# Patient Record
Sex: FEMALE | Race: WHITE | Employment: UNEMPLOYED | URBAN - METROPOLITAN AREA
[De-identification: names, ages, dates, MRNs, and addresses within clinical notes are randomized per-mention and may not be internally consistent; named-entity substitution may affect disease eponyms.]

---

## 2017-01-13 ENCOUNTER — ED HISTORICAL/CONVERTED ENCOUNTER (OUTPATIENT)
Dept: OTHER | Age: 42
End: 2017-01-13

## 2017-05-28 ENCOUNTER — ED HISTORICAL/CONVERTED ENCOUNTER (OUTPATIENT)
Dept: OTHER | Age: 42
End: 2017-05-28

## 2017-05-31 ENCOUNTER — OP HISTORICAL/CONVERTED ENCOUNTER (OUTPATIENT)
Dept: OTHER | Age: 42
End: 2017-05-31

## 2017-07-22 ENCOUNTER — ED HISTORICAL/CONVERTED ENCOUNTER (OUTPATIENT)
Dept: OTHER | Age: 42
End: 2017-07-22

## 2017-11-01 ENCOUNTER — ED HISTORICAL/CONVERTED ENCOUNTER (OUTPATIENT)
Dept: OTHER | Age: 42
End: 2017-11-01

## 2018-03-27 ENCOUNTER — ED HISTORICAL/CONVERTED ENCOUNTER (OUTPATIENT)
Dept: OTHER | Age: 43
End: 2018-03-27

## 2018-06-17 ENCOUNTER — ANESTHESIA EVENT (OUTPATIENT)
Dept: MEDSURG UNIT | Age: 43
End: 2018-06-17
Payer: COMMERCIAL

## 2018-06-17 RX ORDER — SODIUM CHLORIDE 0.9 % (FLUSH) 0.9 %
5-10 SYRINGE (ML) INJECTION AS NEEDED
Status: CANCELLED | OUTPATIENT
Start: 2018-06-17

## 2018-06-17 RX ORDER — SODIUM CHLORIDE 9 MG/ML
1000 INJECTION, SOLUTION INTRAVENOUS CONTINUOUS
Status: CANCELLED | OUTPATIENT
Start: 2018-06-17

## 2018-06-17 RX ORDER — DIPHENHYDRAMINE HYDROCHLORIDE 50 MG/ML
12.5 INJECTION, SOLUTION INTRAMUSCULAR; INTRAVENOUS AS NEEDED
Status: CANCELLED | OUTPATIENT
Start: 2018-06-17 | End: 2018-06-17

## 2018-06-17 RX ORDER — MIDAZOLAM HYDROCHLORIDE 1 MG/ML
0.5 INJECTION, SOLUTION INTRAMUSCULAR; INTRAVENOUS
Status: CANCELLED | OUTPATIENT
Start: 2018-06-17

## 2018-06-17 RX ORDER — SODIUM CHLORIDE, SODIUM LACTATE, POTASSIUM CHLORIDE, CALCIUM CHLORIDE 600; 310; 30; 20 MG/100ML; MG/100ML; MG/100ML; MG/100ML
25 INJECTION, SOLUTION INTRAVENOUS CONTINUOUS
Status: CANCELLED | OUTPATIENT
Start: 2018-06-17

## 2018-06-17 RX ORDER — MORPHINE SULFATE 10 MG/ML
2 INJECTION, SOLUTION INTRAMUSCULAR; INTRAVENOUS
Status: CANCELLED | OUTPATIENT
Start: 2018-06-17

## 2018-06-17 RX ORDER — ONDANSETRON 2 MG/ML
4 INJECTION INTRAMUSCULAR; INTRAVENOUS AS NEEDED
Status: CANCELLED | OUTPATIENT
Start: 2018-06-17

## 2018-06-17 RX ORDER — FENTANYL CITRATE 50 UG/ML
25 INJECTION, SOLUTION INTRAMUSCULAR; INTRAVENOUS
Status: CANCELLED | OUTPATIENT
Start: 2018-06-17

## 2018-06-17 NOTE — H&P
CARE TEAM:  Patient Care Team:  Sheila Doan MD as PCP - General (Family Medicine)     ASSESSMENT:  1. Other closed intra-articular fracture of distal end of right radius with malunion, subsequent encounter    2. Arthritis of right wrist          There is no problem list on file for this patient.        PLAN:  Treatment Plan: We have discussed treatment options at length. I have offered a cortisone injection but she has declined. We have discussed surgical management. I recommend a total wrist fusion with distal ulnar resection. We have discussed this at length. We have discussed the reasonable expectations. We have discussed usual postoperative course. She has given informed consent to proceed.         Orders Placed This Encounter    X-ray wrist right 3+ views (11216)      Follow-up: Return for first postoperative visit.      HISTORY OF PRESENT ILLNESS:  Chief Complaint: Pain of the Right Wrist     Age: 44 y.o. Sex: female   History of present illness: Mae Sierra  is a pleasant 44 y.o. female who presents today for evaluation of her right wrist. She complains of significant pain in the wrist.  She has a history of a distal radius fracture 3 years ago. Comminuted intra-articular fracture. She had a nonunion. At the time she was a heavy smoker. She reports she has cut back some on her smoking. She underwent attempt at revision surgery with bridge plating and bone grafting. This was partially successful. Her hardware was removed. She has a resulting malunion. She now complains of significant pain. sHe complains of deformity of the wrist. Limited motion.     Past medical history, past surgical history, medications, allergies, social history, and review of systems have been reviewed by me. No current facility-administered medications on file prior to encounter.       Current Outpatient Prescriptions on File Prior to Encounter   Medication Sig Dispense Refill    gabapentin (NEURONTIN) 300 mg capsule Take 300 mg by mouth three (3) times daily.  ALPRAZolam (XANAX) 1 mg tablet Take 1 mg by mouth three (3) times daily.  OTHER paxil PO daily, pt does not know dosage      OTHER Propanolol PO daily, for migraine headache prevention, patient does not know dosage         Past Medical History:   Diagnosis Date    Cervical cancer ([facility] [address] 1999    Ill-defined condition     migraine ha    Psychiatric disorder     depression,panic attacks    Wrist fracture, right 8/2015       Allergies   Allergen Reactions    Seafood Anaphylaxis    Penicillins Hives and Swelling       Social History     Social History    Marital status:      Spouse name: N/A    Number of children: N/A    Years of education: N/A     Occupational History    Not on file. Social History Main Topics    Smoking status: Current Every Day Smoker     Packs/day: 1.00     Years: 23.00    Smokeless tobacco: Not on file    Alcohol use Not on file    Drug use: Not on file    Sexual activity: Not on file     Other Topics Concern    Not on file     Social History Narrative          Review of Systems   6/12/2018     Constitutional: Unexplained: Negative  Genitourinary: Frequent Urination: Negative  HEENT: Vision Loss: Positive  Neurological: Memory Loss: Negative  Integumentary: Rash: Negative  Cardiovascular: Palpatations: Negative  Hematologic: Bruises/Bleeds Easily: Negative  Gastrointestinal: Constipation: Negative  Immunological: Seasonal Allergies: Negative  Musculoskeletal: Joint Pain: Positive        OBJECTIVE:  Constitutional:  No acute distress. Pleasant and cooperative with exam.  HEENT: Normocephalic. Atraumatic. Eyes are clear  Hearing intact to spoken word   Respiratory:  Breathing unlabored. .  Cardiovascular:  No marked edema. Psychiatric: Alert.  Affect appropriate. Gait: Normal.  Musculoskeletal    Skin is intact. She has obvious deformity of the wrist.  Significant dorsal prominence of the distal ulna.   She has very limited wrist motion. She has 20 degrees of extension. 40 degrees of flexion. She has limited forearm rotation due to guarding. Full digital motion. Minimal swelling. Tenderness globally about the carpus.     IMAGING / STUDIES:   Order: XR WRIST 3+ VW RIGHT - Indication: Other closed intra-articular   fracture of distal end of right radius with malunion, subsequent encounter        X-ray Wrist Right 3+ Views (53429)     Result Date: 6/12/2018  PA, Lat, Oblique.      Notes  I ordered and interpreted three views of the wrist.  These demonstrate   severely malunited distal radius fracture. She has significant arthritic   change. Significant shortening of the fracture. Significant angulation.     Loss of radial height.

## 2018-06-18 ENCOUNTER — ANESTHESIA (OUTPATIENT)
Dept: MEDSURG UNIT | Age: 43
End: 2018-06-18
Payer: COMMERCIAL

## 2018-06-18 ENCOUNTER — HOSPITAL ENCOUNTER (OUTPATIENT)
Age: 43
Setting detail: OUTPATIENT SURGERY
Discharge: HOME | End: 2018-06-18
Attending: ORTHOPAEDIC SURGERY | Admitting: ORTHOPAEDIC SURGERY
Payer: COMMERCIAL

## 2018-06-18 VITALS
SYSTOLIC BLOOD PRESSURE: 105 MMHG | OXYGEN SATURATION: 95 % | DIASTOLIC BLOOD PRESSURE: 58 MMHG | TEMPERATURE: 98.2 F | RESPIRATION RATE: 15 BRPM | WEIGHT: 147.49 LBS | HEART RATE: 74 BPM | HEIGHT: 71 IN | BODY MASS INDEX: 20.65 KG/M2

## 2018-06-18 DIAGNOSIS — M19.031 ARTHRITIS OF RIGHT WRIST: Primary | ICD-10-CM

## 2018-06-18 PROCEDURE — 77030003601 HC NDL NRV BLK BBMI -A

## 2018-06-18 PROCEDURE — 77030003737 HC BIT DRL ACMD -C: Performed by: ORTHOPAEDIC SURGERY

## 2018-06-18 PROCEDURE — 74011250636 HC RX REV CODE- 250/636: Performed by: ANESTHESIOLOGY

## 2018-06-18 PROCEDURE — 74011000250 HC RX REV CODE- 250

## 2018-06-18 PROCEDURE — 77030018836 HC SOL IRR NACL ICUM -A: Performed by: ORTHOPAEDIC SURGERY

## 2018-06-18 PROCEDURE — 76210000034 HC AMBSU PH I REC 0.5 TO 1 HR: Performed by: ORTHOPAEDIC SURGERY

## 2018-06-18 PROCEDURE — 77030020754 HC CUF TRNQT 2BLA STRY -B: Performed by: ORTHOPAEDIC SURGERY

## 2018-06-18 PROCEDURE — 77030032490 HC SLV COMPR SCD KNE COVD -B: Performed by: ORTHOPAEDIC SURGERY

## 2018-06-18 PROCEDURE — 74011250636 HC RX REV CODE- 250/636

## 2018-06-18 PROCEDURE — 77030035418: Performed by: ORTHOPAEDIC SURGERY

## 2018-06-18 PROCEDURE — 76030000001 HC AMB SURG OR TIME 1 TO 1.5: Performed by: ORTHOPAEDIC SURGERY

## 2018-06-18 PROCEDURE — 77030020782 HC GWN BAIR PAWS FLX 3M -B

## 2018-06-18 PROCEDURE — 77030002916 HC SUT ETHLN J&J -A: Performed by: ORTHOPAEDIC SURGERY

## 2018-06-18 PROCEDURE — 74011250636 HC RX REV CODE- 250/636: Performed by: ORTHOPAEDIC SURGERY

## 2018-06-18 PROCEDURE — 77030031139 HC SUT VCRL2 J&J -A: Performed by: ORTHOPAEDIC SURGERY

## 2018-06-18 PROCEDURE — 77030006788 HC BLD SAW OSC STRY -B: Performed by: ORTHOPAEDIC SURGERY

## 2018-06-18 PROCEDURE — C1713 ANCHOR/SCREW BN/BN,TIS/BN: HCPCS | Performed by: ORTHOPAEDIC SURGERY

## 2018-06-18 PROCEDURE — 77030028224 HC PDNG CST BSNM -A: Performed by: ORTHOPAEDIC SURGERY

## 2018-06-18 PROCEDURE — 77030010509 HC AIRWY LMA MSK TELE -A: Performed by: ANESTHESIOLOGY

## 2018-06-18 PROCEDURE — 85018 HEMOGLOBIN: CPT

## 2018-06-18 PROCEDURE — 76060000062 HC AMB SURG ANES 1 TO 1.5 HR: Performed by: ORTHOPAEDIC SURGERY

## 2018-06-18 DEVICE — 2.3 MM X 8 MM HEXALOBE MULTISCREW
Type: IMPLANTABLE DEVICE | Site: WRIST | Status: FUNCTIONAL
Brand: ACUMED

## 2018-06-18 DEVICE — 3.5MM X 10MM LOCKING HEXALOBE SCREW
Type: IMPLANTABLE DEVICE | Site: WRIST | Status: FUNCTIONAL
Brand: ACUMED

## 2018-06-18 DEVICE — 2.3 MM X 14 MM HEXALOBE MULTISCREW
Type: IMPLANTABLE DEVICE | Site: WRIST | Status: FUNCTIONAL
Brand: ACUMED

## 2018-06-18 DEVICE — 3.5MM X 12MM NON-LOCKING HEXALOBE SCREW
Type: IMPLANTABLE DEVICE | Site: WRIST | Status: FUNCTIONAL
Brand: ACUMED

## 2018-06-18 DEVICE — 2.3 MM X 10 MM HEXALOBE MULTISCREW
Type: IMPLANTABLE DEVICE | Site: WRIST | Status: FUNCTIONAL
Brand: ACUMED

## 2018-06-18 DEVICE — 2.3 MM X 12 MM HEXALOBE MULTISCREW
Type: IMPLANTABLE DEVICE | Site: WRIST | Status: FUNCTIONAL
Brand: ACUMED

## 2018-06-18 DEVICE — TOTAL WRIST FUSION PLATE, NEUTRAL
Type: IMPLANTABLE DEVICE | Site: WRIST | Status: FUNCTIONAL
Brand: ACUMED

## 2018-06-18 DEVICE — 3.5MM X 12MM LOCKING HEXALOBE SCREW
Type: IMPLANTABLE DEVICE | Site: WRIST | Status: FUNCTIONAL
Brand: ACUMED

## 2018-06-18 DEVICE — 3.5MM X 10MM NON-LOCKING HEXALOBE SCREW
Type: IMPLANTABLE DEVICE | Site: WRIST | Status: FUNCTIONAL
Brand: ACUMED

## 2018-06-18 RX ORDER — SODIUM CHLORIDE 0.9 % (FLUSH) 0.9 %
5-10 SYRINGE (ML) INJECTION AS NEEDED
Status: DISCONTINUED | OUTPATIENT
Start: 2018-06-18 | End: 2018-06-18 | Stop reason: HOSPADM

## 2018-06-18 RX ORDER — DEXTROSE, SODIUM CHLORIDE, SODIUM LACTATE, POTASSIUM CHLORIDE, AND CALCIUM CHLORIDE 5; .6; .31; .03; .02 G/100ML; G/100ML; G/100ML; G/100ML; G/100ML
25 INJECTION, SOLUTION INTRAVENOUS CONTINUOUS
Status: DISCONTINUED | OUTPATIENT
Start: 2018-06-18 | End: 2018-06-18 | Stop reason: HOSPADM

## 2018-06-18 RX ORDER — LIDOCAINE HYDROCHLORIDE 10 MG/ML
0.1 INJECTION, SOLUTION EPIDURAL; INFILTRATION; INTRACAUDAL; PERINEURAL AS NEEDED
Status: DISCONTINUED | OUTPATIENT
Start: 2018-06-18 | End: 2018-06-18 | Stop reason: HOSPADM

## 2018-06-18 RX ORDER — DEXAMETHASONE SODIUM PHOSPHATE 4 MG/ML
INJECTION, SOLUTION INTRA-ARTICULAR; INTRALESIONAL; INTRAMUSCULAR; INTRAVENOUS; SOFT TISSUE AS NEEDED
Status: DISCONTINUED | OUTPATIENT
Start: 2018-06-18 | End: 2018-06-18 | Stop reason: HOSPADM

## 2018-06-18 RX ORDER — VANCOMYCIN HYDROCHLORIDE
1250 ONCE
Status: COMPLETED | OUTPATIENT
Start: 2018-06-18 | End: 2018-06-18

## 2018-06-18 RX ORDER — FENTANYL CITRATE 50 UG/ML
50 INJECTION, SOLUTION INTRAMUSCULAR; INTRAVENOUS AS NEEDED
Status: COMPLETED | OUTPATIENT
Start: 2018-06-18 | End: 2018-06-18

## 2018-06-18 RX ORDER — SODIUM CHLORIDE, SODIUM LACTATE, POTASSIUM CHLORIDE, CALCIUM CHLORIDE 600; 310; 30; 20 MG/100ML; MG/100ML; MG/100ML; MG/100ML
25 INJECTION, SOLUTION INTRAVENOUS CONTINUOUS
Status: DISCONTINUED | OUTPATIENT
Start: 2018-06-18 | End: 2018-06-18 | Stop reason: HOSPADM

## 2018-06-18 RX ORDER — ONDANSETRON 2 MG/ML
INJECTION INTRAMUSCULAR; INTRAVENOUS AS NEEDED
Status: DISCONTINUED | OUTPATIENT
Start: 2018-06-18 | End: 2018-06-18 | Stop reason: HOSPADM

## 2018-06-18 RX ORDER — PROPOFOL 10 MG/ML
INJECTION, EMULSION INTRAVENOUS AS NEEDED
Status: DISCONTINUED | OUTPATIENT
Start: 2018-06-18 | End: 2018-06-18 | Stop reason: HOSPADM

## 2018-06-18 RX ORDER — MIDAZOLAM HYDROCHLORIDE 1 MG/ML
1 INJECTION, SOLUTION INTRAMUSCULAR; INTRAVENOUS AS NEEDED
Status: DISCONTINUED | OUTPATIENT
Start: 2018-06-18 | End: 2018-06-18 | Stop reason: HOSPADM

## 2018-06-18 RX ORDER — FENTANYL CITRATE 50 UG/ML
INJECTION, SOLUTION INTRAMUSCULAR; INTRAVENOUS AS NEEDED
Status: DISCONTINUED | OUTPATIENT
Start: 2018-06-18 | End: 2018-06-18 | Stop reason: HOSPADM

## 2018-06-18 RX ORDER — LIDOCAINE HYDROCHLORIDE 20 MG/ML
INJECTION, SOLUTION EPIDURAL; INFILTRATION; INTRACAUDAL; PERINEURAL AS NEEDED
Status: DISCONTINUED | OUTPATIENT
Start: 2018-06-18 | End: 2018-06-18 | Stop reason: HOSPADM

## 2018-06-18 RX ORDER — OXYCODONE HYDROCHLORIDE 15 MG/1
15 TABLET ORAL
Qty: 20 TAB | Refills: 0 | Status: SHIPPED | OUTPATIENT
Start: 2018-06-18

## 2018-06-18 RX ORDER — CARBAMAZEPINE 400 MG/1
800 TABLET, EXTENDED RELEASE ORAL ONCE
COMMUNITY

## 2018-06-18 RX ORDER — SODIUM CHLORIDE 9 MG/ML
25 INJECTION, SOLUTION INTRAVENOUS CONTINUOUS
Status: DISCONTINUED | OUTPATIENT
Start: 2018-06-18 | End: 2018-06-18 | Stop reason: HOSPADM

## 2018-06-18 RX ORDER — OXYCODONE HYDROCHLORIDE 15 MG/1
15 TABLET ORAL
Status: ON HOLD | COMMUNITY
End: 2018-06-18

## 2018-06-18 RX ORDER — SODIUM CHLORIDE, SODIUM LACTATE, POTASSIUM CHLORIDE, CALCIUM CHLORIDE 600; 310; 30; 20 MG/100ML; MG/100ML; MG/100ML; MG/100ML
INJECTION, SOLUTION INTRAVENOUS
Status: DISCONTINUED | OUTPATIENT
Start: 2018-06-18 | End: 2018-06-18 | Stop reason: HOSPADM

## 2018-06-18 RX ORDER — SODIUM CHLORIDE 0.9 % (FLUSH) 0.9 %
5-10 SYRINGE (ML) INJECTION EVERY 8 HOURS
Status: DISCONTINUED | OUTPATIENT
Start: 2018-06-18 | End: 2018-06-18 | Stop reason: HOSPADM

## 2018-06-18 RX ORDER — LEVETIRACETAM 750 MG/1
750 TABLET ORAL 2 TIMES DAILY
COMMUNITY

## 2018-06-18 RX ADMIN — MIDAZOLAM HYDROCHLORIDE 2 MG: 1 INJECTION, SOLUTION INTRAMUSCULAR; INTRAVENOUS at 13:11

## 2018-06-18 RX ADMIN — ONDANSETRON 4 MG: 2 INJECTION INTRAMUSCULAR; INTRAVENOUS at 15:13

## 2018-06-18 RX ADMIN — MIDAZOLAM HYDROCHLORIDE 1 MG: 1 INJECTION, SOLUTION INTRAMUSCULAR; INTRAVENOUS at 13:14

## 2018-06-18 RX ADMIN — DEXAMETHASONE SODIUM PHOSPHATE 4 MG: 4 INJECTION, SOLUTION INTRA-ARTICULAR; INTRALESIONAL; INTRAMUSCULAR; INTRAVENOUS; SOFT TISSUE at 14:22

## 2018-06-18 RX ADMIN — FENTANYL CITRATE 50 MCG: 50 INJECTION, SOLUTION INTRAMUSCULAR; INTRAVENOUS at 13:15

## 2018-06-18 RX ADMIN — FENTANYL CITRATE 25 MCG: 50 INJECTION, SOLUTION INTRAMUSCULAR; INTRAVENOUS at 15:25

## 2018-06-18 RX ADMIN — MIDAZOLAM HYDROCHLORIDE 2 MG: 1 INJECTION, SOLUTION INTRAMUSCULAR; INTRAVENOUS at 13:15

## 2018-06-18 RX ADMIN — VANCOMYCIN HYDROCHLORIDE 1250 MG: 10 INJECTION, POWDER, LYOPHILIZED, FOR SOLUTION INTRAVENOUS at 13:27

## 2018-06-18 RX ADMIN — PROPOFOL 200 MG: 10 INJECTION, EMULSION INTRAVENOUS at 14:13

## 2018-06-18 RX ADMIN — SODIUM CHLORIDE, SODIUM LACTATE, POTASSIUM CHLORIDE, AND CALCIUM CHLORIDE 25 ML/HR: 600; 310; 30; 20 INJECTION, SOLUTION INTRAVENOUS at 13:39

## 2018-06-18 RX ADMIN — SODIUM CHLORIDE, SODIUM LACTATE, POTASSIUM CHLORIDE, CALCIUM CHLORIDE: 600; 310; 30; 20 INJECTION, SOLUTION INTRAVENOUS at 14:09

## 2018-06-18 RX ADMIN — FENTANYL CITRATE 50 MCG: 50 INJECTION, SOLUTION INTRAMUSCULAR; INTRAVENOUS at 13:12

## 2018-06-18 RX ADMIN — FENTANYL CITRATE 25 MCG: 50 INJECTION, SOLUTION INTRAMUSCULAR; INTRAVENOUS at 14:13

## 2018-06-18 RX ADMIN — FENTANYL CITRATE 25 MCG: 50 INJECTION, SOLUTION INTRAMUSCULAR; INTRAVENOUS at 15:19

## 2018-06-18 RX ADMIN — LIDOCAINE HYDROCHLORIDE 60 MG: 20 INJECTION, SOLUTION EPIDURAL; INFILTRATION; INTRACAUDAL; PERINEURAL at 14:13

## 2018-06-18 NOTE — IP AVS SNAPSHOT
[address] [address] 
[phone] Patient: Fortino Lang MRN: [id number] :1975 About your hospitalization You were admitted on:  2018 You last received care in the:  [facility] PACU You were discharged on:  2018 Why you were hospitalized Your primary diagnosis was:  Not on File Follow-up Information Follow up With Details Comments Contact Info Armen Mckeon MD In 2 weeks  [address] [facility] [address] [place] [address] 
[phone] Not On File Bshsi   Not On File (62) Patient has a PCP but that physician is not listed in [address] [facility]. Discharge Orders None A check devika indicates which time of day the medication should be taken. My Medications CONTINUE taking these medications Instructions Each Dose to Equal  
 Morning Noon Evening Bedtime ALPRAZolam 1 mg tablet Commonly known as:  Lamount Marten Your last dose was: Your next dose is: Take 1 mg by mouth three (3) times daily. 1 mg  
    
   
   
   
  
 gabapentin 300 mg capsule Commonly known as:  NEURONTIN Your last dose was: Your next dose is: Take 300 mg by mouth three (3) times daily. 300 mg  
    
   
   
   
  
 KEPPRA 750 mg tablet Generic drug:  levETIRAcetam  
   
Your last dose was: Your next dose is: Take 750 mg by mouth two (2) times a day. 750 mg  
    
   
   
   
  
 * OTHER Your last dose was: Your next dose is:    
   
   
 paxil PO daily, pt does not know dosage * OTHER Your last dose was: Your next dose is:    
   
   
 Propanolol PO daily, for migraine headache prevention, patient does not know dosage  
     
   
   
   
  
 oxyCODONE IR 15 mg immediate release tablet Commonly known as:  OXY-IR Your last dose was:     
   
Your next dose is:    
   
   
 Take 1 Tab by mouth every four (4) hours as needed for Pain. Max Daily Amount: 90 mg.  
 15 mg  
    
   
   
   
  
 TEGretol  mg SR tablet Generic drug:  carBAMazepine XR Your last dose was: Your next dose is: Take 800 mg by mouth once. 800 mg * Notice: This list has 2 medication(s) that are the same as other medications prescribed for you. Read the directions carefully, and ask your doctor or other care provider to review them with you. Where to Get Your Medications Information on where to get these meds will be given to you by the nurse or doctor. ! Ask your nurse or doctor about these medications  
  oxyCODONE IR 15 mg immediate release tablet Opioid Education Prescription Opioids: What You Need to Know: 
 
Prescription opioids can be used to help relieve moderate-to-severe pain and are often prescribed following a surgery or injury, or for certain health conditions. These medications can be an important part of treatment but also come with serious risks. Opioids are strong pain medicines. Examples include hydrocodone, oxycodone, fentanyl, and morphine. Heroin is an example of an illegal opioid. It is important to work with your health care provider to make sure you are getting the safest, most effective care. WHAT ARE THE RISKS AND SIDE EFFECTS OF OPIOID USE? Prescription opioids carry serious risks of addiction and overdose, especially with prolonged use. An opioid overdose, often marked by slow breathing, can cause sudden death. The use of prescription opioids can have a number of side effects as well, even when taken as directed. · Tolerance-meaning you might need to take more of a medication for the same pain relief · Physical dependence-meaning you have symptoms of withdrawal when the medication is stopped.   Withdrawal symptoms can include nausea, sweating, chills, diarrhea, stomach cramps, and muscle aches. Withdrawal can last up to several weeks, depending on which drug you took and how long you took it. · Increased sensitivity to pain · Constipation · Nausea, vomiting, and dry mouth · Sleepiness and dizziness · Confusion · Depression · Low levels of testosterone that can result in lower sex drive, energy, and strength · Itching and sweating RISKS ARE GREATER WITH:      
· History of drug misuse, substance use disorder, or overdose · Mental health conditions (such as depression or anxiety) · Sleep apnea · Older age (72 years or older) · Pregnancy Avoid alcohol while taking prescription opioids. Also, unless specifically advised by your health care provider, medications to avoid include: · Benzodiazepines (such as Xanax or Valium) · Muscle relaxants (such as Soma or Flexeril) · Hypnotics (such as Ambien or Lunesta) · Other prescription opioids KNOW YOUR OPTIONS Talk to your health care provider about ways to manage your pain that don't involve prescription opioids. Some of these options may actually work better and have fewer risks and side effects. Options may include: 
· Pain relievers such as acetaminophen, ibuprofen, and naproxen · Some medications that are also used for depression or seizures · Physical therapy and exercise · Counseling to help patients learn how to cope better with triggers of pain and stress. · Application of heat or cold compress · Massage therapy · Relaxation techniques Be Informed Make sure you know the name of your medication, how much and how often to take it, and its potential risks & side effects. IF YOU ARE PRESCRIBED OPIOIDS FOR PAIN: 
· Never take opioids in greater amounts or more often than prescribed. Remember the goal is not to be pain-free but to manage your pain at a tolerable level. · Follow up with your primary care provider to: · Work together to create a plan on how to manage your pain.  
· Talk about ways to help manage your pain that don't involve prescription opioids. · Talk about any and all concerns and side effects. · Help prevent misuse and abuse. · Never sell or share prescription opioids · Help prevent misuse and abuse. · Store prescription opioids in a secure place and out of reach of others (this may include visitors, children, friends, and family). · Safely dispose of unused/unwanted prescription opioids: Find your community drug take-back program or your pharmacy mail-back program, or flush them down the toilet, following guidance from the Food and Drug Administration (www.fda.gov/Drugs/ResourcesForYou). · Visit www.cdc.gov/drugoverdose to learn about the risks of opioid abuse and overdose. · If you believe you may be struggling with addiction, tell your health care provider and ask for guidance or call [facility] at 9-883-534-UWCW. Discharge Instructions Dr. Molly Heredia Upper Extremity Postoperative Instructions 1. Please maintain the dressing and /or splint placed at surgery until your follow up appointment where it will be removed. Please keep the dressing clean and dry. If you have any questions or problems, please call our office at [phone]. 2. Please elevate the operative extremity to the level of the heart to keep swelling at a minimum. You may get up to move around, but when seated please keep the extremity elevated as much as possible. This will decrease swelling and pain. 3. You were told to be non-weight bearing following surgery. Please do not lift anything heavier than 1 lb with your operative hand. 4. You may ice your Arm/Hand 5 times a day for 20 minutes at a time. 5. You had a block from the Anesthesiologist before surgery. Expect this to wear off around 12-24 hours after you received it.   You should start taking your pain medication before the feeling begins to come back into your arm/ hand. 6. A prescription for pain medication is provided. The key to pain control is staying ahead of the pain. For the first 48-72 hours after your surgery, you may want to take your pain medication on a regular schedule. After that, you may only need it on an as needed basis. 7. If you experience any redness, increased pain, increased swelling not relieved by elevation, drainage, fever, or chills, please call the office at [phone], and speak with Zulma Bunn or Clearance Shade. Please Follow-up at my office [address] [facility] in 2 weeks unless otherwise directed. DISCHARGE SUMMARY from Nurse PATIENT INSTRUCTIONS: 
 
After general anesthesia or intravenous sedation, for 24 hours or while taking prescription Narcotics: · Limit your activities · Do not drive and operate hazardous machinery · Do not make important personal or business decisions · Do  not drink alcoholic beverages · If you have not urinated within 8 hours after discharge, please contact your surgeon on call. Report the following to your surgeon: 
· Excessive pain, swelling, redness or odor of or around the surgical area · Temperature over 100.5 · Nausea and vomiting lasting longer than 4 hours or if unable to take medications · Any signs of decreased circulation or nerve impairment to extremity: change in color, persistent  numbness, tingling, coldness or increase pain · Any questions What to do at Home: 
Recommended activity: See above instructions. If you experience any of the above symptoms, please follow up with Dr. Kvng Green *  Please give a list of your current medications to your Primary Care Provider. *  Please update this list whenever your medications are discontinued, doses are 
    changed, or new medications (including over-the-counter products) are added. *  Please carry medication information at all times in case of emergency situations.  
 
These are general instructions for a healthy lifestyle: No smoking/ No tobacco products/ Avoid exposure to second hand smoke Surgeon General's Warning:  Quitting smoking now greatly reduces serious risk to your health. Obesity, smoking, and sedentary lifestyle greatly increases your risk for illness A healthy diet, regular physical exercise & weight monitoring are important for maintaining a healthy lifestyle You may be retaining fluid if you have a history of heart failure or if you experience any of the following symptoms:  Weight gain of 3 pounds or more overnight or 5 pounds in a week, increased swelling in our hands or feet or shortness of breath while lying flat in bed. Please call your doctor as soon as you notice any of these symptoms; do not wait until your next office visit. Recognize signs and symptoms of STROKE: 
 
F-face looks uneven A-arms unable to move or move unevenly S-speech slurred or non-existent T-time-call 911 as soon as signs and symptoms begin-DO NOT go Back to bed or wait to see if you get better-TIME IS BRAIN. Warning Signs of HEART ATTACK Call 911 if you have these symptoms:  Chest discomfort. Most heart attacks involve discomfort in the center of the chest that lasts more than a few minutes, or that goes away and comes back. It can feel like uncomfortable pressure, squeezing, fullness, or pain.  Discomfort in other areas of the upper body. Symptoms can include pain or discomfort in one or both arms, the back, neck, jaw, or stomach.  Shortness of breath with or without chest discomfort.  Other signs may include breaking out in a cold sweat, nausea, or lightheadedness. Don't wait more than five minutes to call [address]! Fast action can save your life. Calling 911 is almost always the fastest way to get lifesaving treatment.  Emergency Medical Services staff can begin treatment when they arrive  up to an hour sooner than if someone gets to the [facility] by car. The discharge information has been reviewed with the patient and caregiver. The patient and caregiver verbalized understanding. Discharge medications reviewed with the patient and caregiver and appropriate educational materials and side effects teaching were provided. ___________________________________________________________________________________________________________________________________ Introducing [facility] & HEALTH SERVICES! [facility] introduces [employer]era patient portal. Now you can access parts of your medical record, email your doctor's office, and request medication refills online. 1. In your internet browser, go to [email or web address] 2. Click on the First Time User? Click Here link in the Sign In box. You will see the New Member Sign Up page. 3. Enter your Isotera Access Code exactly as it appears below. You will not need to use this code after youve completed the sign-up process. If you do not sign up before the expiration date, you must request a new code. · Isotera Access Code: [email or web address] Expires: 9/16/2018  3:44 PM 
 
4. Enter the last four digits of your Social Security Number (xxxx) and Date of Birth (mm/dd/yyyy) as indicated and click Submit. You will be taken to the next sign-up page. 5. Create a [employer] ID. This will be your Isotera login ID and cannot be changed, so think of one that is secure and easy to remember. 6. Create a [employer] password. You can change your password at any time. 7. Enter your Password Reset Question and Answer. This can be used at a later time if you forget your password. 8. Enter your e-mail address. You will receive e-mail notification when new information is available in [address] 9. Click Sign Up. You can now view and download portions of your medical record. 10. Click the Download Summary menu link to download a portable copy of your medical information.  
 
If you have questions, please visit the Frequently Asked Questions section of the [employer] website. Remember, [employer] is NOT to be used for urgent needs. For medical emergencies, dial 911. Now available from your iPhone and Android! Introducing [facility] As a [employer] patient, I wanted to make you aware of our electronic visit tool called [employer] [facility]. [employer] 24/7 allows you to connect within minutes with a medical provider 24 hours a day, seven days a week via a mobile device or tablet or logging into a secure website from your computer. You can access [facility] from anywhere in the [place]. A virtual visit might be right for you when you have a simple condition and feel like you just dont want to get out of bed, or cant get away from work for an appointment, when your regular [employer] provider is not available (evenings, weekends or holidays), or when youre out of town and need minor care. Electronic visits cost only $49 and if the [employer] 24/7 provider determines a prescription is needed to treat your condition, one can be electronically transmitted to a nearby pharmacy*. Please take a moment to enroll today if you have not already done so. The enrollment process is free and takes just a few minutes. To enroll, please download the [employer] 24/7 heraclio to your tablet or phone, or visit [email or web address] to enroll on your computer. And, as an [address] [place] [address] patient with a [employer] account, the results of your visits will be scanned into your electronic medical record and your primary care provider will be able to view the scanned results. We urge you to continue to see your regular [employer] provider for your ongoing medical care. And while your primary care provider may not be the one available when you seek a [facility] virtual visit, the peace of mind you get from getting a real diagnosis real time can be priceless.    
 
For more information on [facility], view our Frequently Asked Questions (FAQs) at [email or web address]. Sincerely, 
 
Kamilla Hutchison MD 
Chief Medical Officer [address] *:  certain medications cannot be prescribed via [facility] Providers Seen During Your Hospitalization Provider Specialty Primary office phone Griselda Huntsman, MD Orthopedic Surgery [phone] Your Primary Care Physician (PCP) Primary Care Physician Office Phone Office Fax NOT ON FILE ** None ** ** None ** You are allergic to the following Allergen Reactions Seafood Anaphylaxis Penicillins Hives Swelling Recent Documentation Height Weight BMI OB Status Smoking Status 1.791 m 66.9 kg 20.86 kg/m2 Hysterectomy Current Every Day Smoker Emergency Contacts Name Discharge Info Relation Home Work Mobile [facility] CAREGIVER [3] Child [2]   [phone] \[facility] [place] [facility]\"" SURGICAL [facility] DISCHARGE CAREGIVER [3] Spouse [3]   [phone] [address] CAREGIVER [3] Child [2] [phone] Patient Belongings The following personal items are in your possession at time of discharge: 
  Dental Appliances: Partials  Visual Aid: None             Clothing:  (purse in locker) Please provide this summary of care documentation to your next provider. Signatures-by signing, you are acknowledging that this After Visit Summary has been reviewed with you and you have received a copy. Patient Signature:  ____________________________________________________________ Date:  ____________________________________________________________  
  
Bunny Palmer Provider Signature:  ____________________________________________________________ Date:  ____________________________________________________________

## 2018-06-18 NOTE — ANESTHESIA POSTPROCEDURE EVALUATION
Post-Anesthesia Evaluation and Assessment    Patient: Neha Ayala MRN: [id number]  SSN: [id number]    YOB: 1975  Age: 44 y.o. Sex: female       Cardiovascular Function/Vital Signs  Visit Vitals    /58    Pulse 74    Temp 36.8 °C (98.2 °F)    Resp 15    Ht 5' 10.5\" (1.791 m)    Wt 66.9 kg (147 lb 7.8 oz)    SpO2 95%    BMI 20.86 kg/m2       Patient is status post general anesthesia for Procedure(s):  RIGHT WRIST FUSION, RIGHT DISTAL ULNAR RESECTION. Nausea/Vomiting: None    Postoperative hydration reviewed and adequate. Pain:  Pain Scale 1: Numeric (0 - 10) (06/18/18 1527)  Pain Intensity 1: 0 (06/18/18 1527)   Managed    Neurological Status:   Neuro (WDL): Within Defined Limits (06/18/18 1527)  Neuro  LUE Motor Response: Purposeful (06/18/18 1527)  LLE Motor Response: Purposeful (06/18/18 1527)  RUE Motor Response: Pharmocologically paralyzed (06/18/18 1527)  RLE Motor Response: Purposeful (06/18/18 1527)   At baseline    Mental Status and Level of Consciousness: Arousable    Pulmonary Status:   O2 Device: Room air (06/18/18 1531)   Adequate oxygenation and airway patent    Complications related to anesthesia: None    Post-anesthesia assessment completed.  No concerns    Signed By: Stephon Roberson MD     June 18, 2018

## 2018-06-18 NOTE — BRIEF OP NOTE
BRIEF OPERATIVE NOTE    Date of Procedure: 6/18/2018   Preoperative Diagnosis: RIGHT WRIST ARTHRITIS  Postoperative Diagnosis: RIGHT WRIST ARTHRITIS    Procedure(s):  RIGHT WRIST FUSION, RIGHT DISTAL ULNAR RESECTION  Surgeon(s) and Role:     * Sunny Balderrama MD - Primary         Surgical Assistant: non    Surgical Staff:  Circ-1: Oksana Collins RN  Registered Nurse First Assistant: Louie Zavala RN  Scrub RN-1: Srinivas Salazar RN  Event Time In   Incision Start 1425   Incision Close 1517     Anesthesia: Regional   Estimated Blood Loss: min  Specimens: * No specimens in log *   Findings: severe wrist OA   Complications: none  Implants:   Implant Name Type Inv.  Item Serial No.  Lot No. LRB No. Used Action   SCR BNE WRIST FUSION 2.3X14MM -- MULTISCREW HEXALOBE - SNA  SCR BNE WRIST FUSION 2.3X14MM -- MULTISCREW HEXALOBE NA [employer] NA Right 2 Implanted   PLATE WRIST FUSION NEUT --  - SNA  PLATE WRIST FUSION NEUT --  NA [employer] NA Right 1 Implanted   SCR BNE WRIST FUSION 2.3X10MM -- MULTISCREW HEXALOBE - SNA  SCR BNE WRIST FUSION 2.3X10MM -- MULTISCREW HEXALOBE NA [employer] NA Right 1 Implanted   SCR BNE WRIST FUSION 2.3X12MM -- MULTISCREW HEXALOBE - SNA  SCR BNE WRIST FUSION 2.3X12MM -- MULTISCREW HEXALOBE NA [employer] NA Right 1 Implanted   SCR BNE WRIST FUSION 2.3X8MM -- MULTISCREW HEXALOBE - SNA  SCR BNE WRIST FUSION 2.3X8MM -- MULTISCREW HEXALOBE NA [employer] NA Right 1 Implanted   SCR BNE NLCK HEXALOBE 3.5X12MM -- F/ELBOW PLT SYS - SNA  SCR BNE NLCK HEXALOBE 3.5X12MM -- F/ELBOW PLT SYS NA [employer] NA Right 1 Implanted   SCR BNE LCK HEXALOBE 3.5X10MM -- F/ELBOW PLT SYS - SNA  SCR BNE LCK HEXALOBE 3.5X10MM -- F/ELBOW PLT SYS NA [employer] NA Right 1 Implanted   SCR BNE LCK HEXALOBE 3.5X12MM -- F/ELBOW PLT SYS - SNA  SCR BNE LCK HEXALOBE 3.5X12MM -- F/ELBOW PLT SYS NA [employer] NA Right 1 Implanted   SCR BNE NLCK HEXALOBE 3.5X10MM -- F/ELBOW PLT SYS - SNA   SCR BNE NLCK HEXALOBE 3.5X10MM -- F/ELBOW PLT SYS NA [employer] NA Right 1 Implanted

## 2018-06-18 NOTE — DISCHARGE INSTRUCTIONS
Dr. Sharona Hankins Upper Extremity Postoperative Instructions      1. Please maintain the dressing and /or splint placed at surgery until your follow up appointment where it will be removed. Please keep the dressing clean and dry. If you have any questions or problems, please call our office at [phone]. 2. Please elevate the operative extremity to the level of the heart to keep swelling at a minimum. You may get up to move around, but when seated please keep the extremity elevated as much as possible. This will decrease swelling and pain. 3. You were told to be non-weight bearing following surgery. Please do not lift anything heavier than 1 lb with your operative hand. 4. You may ice your Arm/Hand 5 times a day for 20 minutes at a time. 5. You had a block from the Anesthesiologist before surgery. Expect this to wear off around 12-24 hours after you received it. You should start taking your pain medication before the feeling begins to come back into your arm/ hand. 6. A prescription for pain medication is provided. The key to pain control is staying ahead of the pain. For the first 48-72 hours after your surgery, you may want to take your pain medication on a regular schedule. After that, you may only need it on an as needed basis. 7. If you experience any redness, increased pain, increased swelling not relieved by elevation, drainage, fever, or chills, please call the office at [phone], and speak with Alfonso Jones or Mani Hickey. Please Follow-up at my office [address] [facility], [address] in 2 weeks unless otherwise directed.       DISCHARGE SUMMARY from Nurse    PATIENT INSTRUCTIONS:    After general anesthesia or intravenous sedation, for 24 hours or while taking prescription Narcotics:  · Limit your activities  · Do not drive and operate hazardous machinery  · Do not make important personal or business decisions  · Do  not drink alcoholic beverages  · If you have not urinated within 8 hours after discharge, please contact your surgeon on call. Report the following to your surgeon:  · Excessive pain, swelling, redness or odor of or around the surgical area  · Temperature over 100.5  · Nausea and vomiting lasting longer than 4 hours or if unable to take medications  · Any signs of decreased circulation or nerve impairment to extremity: change in color, persistent  numbness, tingling, coldness or increase pain  · Any questions    What to do at Home:  Recommended activity: See above instructions. If you experience any of the above symptoms, please follow up with Dr. Juan Deng     *  Please give a list of your current medications to your Primary Care Provider. *  Please update this list whenever your medications are discontinued, doses are      changed, or new medications (including over-the-counter products) are added. *  Please carry medication information at all times in case of emergency situations. These are general instructions for a healthy lifestyle:    No smoking/ No tobacco products/ Avoid exposure to second hand smoke  Surgeon General's Warning:  Quitting smoking now greatly reduces serious risk to your health. Obesity, smoking, and sedentary lifestyle greatly increases your risk for illness    A healthy diet, regular physical exercise & weight monitoring are important for maintaining a healthy lifestyle    You may be retaining fluid if you have a history of heart failure or if you experience any of the following symptoms:  Weight gain of 3 pounds or more overnight or 5 pounds in a week, increased swelling in our hands or feet or shortness of breath while lying flat in bed. Please call your doctor as soon as you notice any of these symptoms; do not wait until your next office visit.     Recognize signs and symptoms of STROKE:    F-face looks uneven    A-arms unable to move or move unevenly    S-speech slurred or non-existent    T-time-call 911 as soon as signs and symptoms begin-DO NOT go       Back to bed or wait to see if you get better-TIME IS BRAIN. Warning Signs of HEART ATTACK     Call 911 if you have these symptoms:   Chest discomfort. Most heart attacks involve discomfort in the center of the chest that lasts more than a few minutes, or that goes away and comes back. It can feel like uncomfortable pressure, squeezing, fullness, or pain.  Discomfort in other areas of the upper body. Symptoms can include pain or discomfort in one or both arms, the back, neck, jaw, or stomach.  Shortness of breath with or without chest discomfort.  Other signs may include breaking out in a cold sweat, nausea, or lightheadedness. Don't wait more than five minutes to call 911 - MINUTES MATTER! Fast action can save your life. Calling 911 is almost always the fastest way to get lifesaving treatment. Emergency Medical Services staff can begin treatment when they arrive -- up to an hour sooner than if someone gets to the hospital by car. The discharge information has been reviewed with the patient and caregiver. The patient and caregiver verbalized understanding. Discharge medications reviewed with the patient and caregiver and appropriate educational materials and side effects teaching were provided.   ___________________________________________________________________________________________________________________________________

## 2018-06-18 NOTE — PERIOP NOTES
Discharge instructions given and discussed. Questions answered. Sling given and applied onto pt arm. Pt discharged home with adult daughter.

## 2018-06-18 NOTE — ANESTHESIA PROCEDURE NOTES
Peripheral Block    Start time: 6/18/2018 1:12 PM  End time: 6/18/2018 1:22 PM  Performed by: Niurka Shore  Authorized by: Niurka Shore       Pre-procedure:    Indications: at surgeon's request and post-op pain management    Preanesthetic Checklist: risks and benefits discussed, site marked and timeout performed      Block Type:   Block Type:  Supraclavicular  Laterality:  Right  Monitoring:  Standard ASA monitoring, continuous pulse ox, frequent vital sign checks, heart rate, responsive to questions and oxygen  Injection Technique:  Single shot  Procedures: ultrasound guided and nerve stimulator    Patient Position: supine  Prep: betadine and povidone-iodine 7.5% surgical scrub    Location:  Supraclavicular  Needle Type:  Stimuplex  Needle Gauge:  22 G  Needle Localization:  Nerve stimulator and ultrasound guidance  Motor Response: minimal motor response >0.4 mA    Medication Injected:  0.5%  ropivacaine  Volume (mL):  30    Assessment:  Number of attempts:  1  Injection Assessment:  Incremental injection every 5 mL, local visualized surrounding nerve on ultrasound, negative aspiration for blood, no intravascular symptoms, negative aspiration for CSF, no paresthesia and ultrasound image on chart  Patient tolerance:  Patient tolerated the procedure well with no immediate complications

## 2018-06-18 NOTE — OP NOTES
[address]  OPERATIVE REPORT    Neelam Lomas  MR#: [id number]  : 1975  ACCOUNT #: [de-identified]   DATE OF SERVICE: 2018    PREOPERATIVE DIAGNOSES:    1. Right wrist posttraumatic osteoarthritis. 2.  Right distal radioulnar joint posttraumatic arthritis. POSTOPERATIVE DIAGNOSES:   1. Right wrist posttraumatic osteoarthritis. 2.  Right distal radioulnar joint posttraumatic arthritis. PROCEDURE PERFORMED:  1. Right wrist total fusion with local bone graft. 2.  Right distal ulnar resection. SURGEON:  Nivia Sadler MD    ASSISTANT:  Claudeen Coach    ANESTHESIA:  Regional plus general.    ESTIMATED BLOOD LOSS:  Minimal.    SPECIMENS:  None. COMPLICATIONS:  None. IMPLANTS:  An Acumed dorsal wrist fusion plate. INDICATIONS FOR THE PROCEDURE:  A 69-year-old female who is status post a comminuted displaced intra-articular distal radius fracture several years ago. She is a heavy smoker. She underwent ORIF, but had a resulting nonunion. She underwent a nonunion repair with bone graft. This resulted in a malunion of her fracture. She now has developed severe arthritis of the wrist with incongruity of the DRUJ and arthritis of the DRUJ. She has severe pain. She has elected for total wrist fusion and distal ulnar resection. I have discussed risks, benefits, potential complications, and alternatives to surgery with her and she has given informed consent to proceed. DESCRIPTION OF THE PROCEDURE:  The patient was identified in the preoperative holding area. Informed consent was obtained, the operative site was marked. Regional anesthesia was established by the anesthesia team.  She was transported to the OR and placed supine on the operating table. All bony prominences were well padded. A tourniquet was applied to the upper brachium.   After induction of general anesthesia, the right upper extremity was sterilely prepped and draped in the usual fashion. Surgical timeout was held. The operative site was confirmed. Preoperative antibiotics were given. After Esmarch exsanguination, the tourniquet was elevated to 250 mmHg. Her prior dorsal incision was opened. Large skin flaps were raised. Dorsal cutaneous nerves were identified and protected. The extensor retinaculum was incised between the second and fourth dorsal compartments. The second and fourth dorsal compartments were elevated off of the wrist capsule. The wrist capsule was incised. The proximal row bones were then excised with a rongeur and a knife. These bones were then decorticated and morcellized into cancellous bone graft. This was saved for later. The proximal aspect of the capitate was debrided of articular cartilage down to cancellous bone. A saw was used to shorten the radius slightly. She had significant volar angulation which was corrected. Cartilage and subchondral bone was debrided down to cancellous bone. The distal ulna was then exposed. A saw was used to transect the ulna at the ulnar neck. The distal head of the ulna was excised directly off of its soft tissue attachments. The wrist was then reduced. Dorsal plate was applied to the second metacarpal and the dorsal distal radius. The plate was affixed to the metacarpal first.  The carpus was then reduced onto the distal radius and a single nonlocking screw was placed into the distal radius proximally. Fluoroscopy confirmed good reduction of the fusion site and good alignment of the hardware. Additional 3.5 mm locking and nonlocking screws were placed proximally. The bone graft was then packed into the fusion site. The capsule was closed with a 0 Vicryl. The EPL was transposed and the extensor retinaculum was closed with a 3-0 Vicryl. The skin was closed in layers with 3-0 Vicryl and 4-0 nylon. Sterile dressings were applied. A well-padded, well-molded short arm splint was applied.   The tourniquet was let down and brisk cap refill returned to the digits. She was transferred to the PACU in stable condition without complication.       Charmaine Byrne MD       [address] [place] [address] / LEONIE  D: 06/18/2018 15:27     T: 06/18/2018 15:50  JOB #: [id number]

## 2018-06-18 NOTE — ANESTHESIA PREPROCEDURE EVALUATION
Anesthetic History   No history of anesthetic complications            Review of Systems / Medical History  Patient summary reviewed, nursing notes reviewed and pertinent labs reviewed    Pulmonary          Smoker         Neuro/Psych     seizures    Headaches     Cardiovascular  Within defined limits                     GI/Hepatic/Renal  Within defined limits              Endo/Other        Arthritis and cancer     Other Findings   Comments: RA         Physical Exam    Airway  Mallampati: II  TM Distance: > 6 cm  Neck ROM: normal range of motion   Mouth opening: Normal     Cardiovascular  Regular rate and rhythm,  S1 and S2 normal,  no murmur, click, rub, or gallop             Dental  No notable dental hx       Pulmonary  Breath sounds clear to auscultation               Abdominal  GI exam deferred       Other Findings            Anesthetic Plan    ASA: 3  Anesthesia type: general      Post-op pain plan if not by surgeon: peripheral nerve block single    Induction: Intravenous  Anesthetic plan and risks discussed with: Patient

## 2018-06-18 NOTE — INTERVAL H&P NOTE
H&P Update:  Flavio Gandhi was seen and examined. History and physical has been reviewed. The patient has been examined.  There have been no significant clinical changes since the completion of the originally dated History and Physical.    Signed By: General Salomon MD     June 18, 2018 1:51 PM

## 2018-06-19 LAB — HGB BLD-MCNC: 13.2 G/DL (ref 11.5–16)

## 2018-10-31 ENCOUNTER — ED HISTORICAL/CONVERTED ENCOUNTER (OUTPATIENT)
Dept: OTHER | Age: 43
End: 2018-10-31

## 2018-11-14 ENCOUNTER — ED HISTORICAL/CONVERTED ENCOUNTER (OUTPATIENT)
Dept: OTHER | Age: 43
End: 2018-11-14

## 2019-06-09 ENCOUNTER — ED HISTORICAL/CONVERTED ENCOUNTER (OUTPATIENT)
Dept: OTHER | Age: 44
End: 2019-06-09

## 2020-03-21 ENCOUNTER — ED HISTORICAL/CONVERTED ENCOUNTER (OUTPATIENT)
Dept: OTHER | Age: 45
End: 2020-03-21

## 2020-04-14 ENCOUNTER — ED HISTORICAL/CONVERTED ENCOUNTER (OUTPATIENT)
Dept: OTHER | Age: 45
End: 2020-04-14

## 2020-05-23 ENCOUNTER — ED HISTORICAL/CONVERTED ENCOUNTER (OUTPATIENT)
Dept: OTHER | Age: 45
End: 2020-05-23

## 2020-06-07 ENCOUNTER — OP HISTORICAL/CONVERTED ENCOUNTER (OUTPATIENT)
Dept: OTHER | Age: 45
End: 2020-06-07

## 2022-07-11 ENCOUNTER — TRANSCRIBE ORDER (OUTPATIENT)
Dept: REGISTRATION | Age: 47
End: 2022-07-11

## 2022-07-11 ENCOUNTER — HOSPITAL ENCOUNTER (OUTPATIENT)
Dept: GENERAL RADIOLOGY | Age: 47
Discharge: HOME | End: 2022-07-11
Payer: COMMERCIAL

## 2022-07-11 DIAGNOSIS — M51.37 DISC DEGENERATION, LUMBOSACRAL: Primary | ICD-10-CM

## 2022-07-11 DIAGNOSIS — M16.10 DEGENERATIVE JOINT DISEASE OF PELVIC REGION: ICD-10-CM

## 2022-07-11 DIAGNOSIS — M51.37 DISC DEGENERATION, LUMBOSACRAL: ICD-10-CM

## 2022-07-11 DIAGNOSIS — M16.10 DEGENERATIVE JOINT DISEASE OF PELVIC REGION: Primary | ICD-10-CM

## 2022-07-11 PROCEDURE — 72110 X-RAY EXAM L-2 SPINE 4/>VWS: CPT

## 2022-07-11 PROCEDURE — 73502 X-RAY EXAM HIP UNI 2-3 VIEWS: CPT

## 2022-08-09 ENCOUNTER — TRANSCRIBE ORDER (OUTPATIENT)
Dept: SCHEDULING | Age: 47
End: 2022-08-09

## 2022-08-09 DIAGNOSIS — M54.10 RADICULOPATHY, SITE UNSPECIFIED: ICD-10-CM

## 2022-08-09 DIAGNOSIS — M51.37 OTHER INTERVERTEBRAL DISC DEGENERATION, LUMBOSACRAL REGION: ICD-10-CM

## 2022-08-09 DIAGNOSIS — M47.816 SPONDYLOSIS WITHOUT MYELOPATHY OR RADICULOPATHY, LUMBAR REGION: ICD-10-CM

## 2022-08-09 DIAGNOSIS — M54.51 VERTEBROGENIC LOW BACK PAIN: Primary | ICD-10-CM

## 2023-05-01 ENCOUNTER — HOSPITAL ENCOUNTER (INPATIENT)
Age: 48
LOS: 4 days | Discharge: HOME | End: 2023-05-05
Attending: EMERGENCY MEDICINE | Admitting: FAMILY MEDICINE
Payer: COMMERCIAL

## 2023-05-01 ENCOUNTER — APPOINTMENT (OUTPATIENT)
Dept: CT IMAGING | Age: 48
End: 2023-05-01
Attending: EMERGENCY MEDICINE
Payer: COMMERCIAL

## 2023-05-01 DIAGNOSIS — R56.9 SEIZURE (HCC): Primary | ICD-10-CM

## 2023-05-01 DIAGNOSIS — S22.43XA CLOSED FRACTURE OF MULTIPLE RIBS OF BOTH SIDES, INITIAL ENCOUNTER: ICD-10-CM

## 2023-05-01 LAB
ALBUMIN SERPL-MCNC: 3.1 G/DL (ref 3.5–5)
ALBUMIN/GLOB SERPL: 0.8 (ref 1.1–2.2)
ALP SERPL-CCNC: 74 U/L (ref 45–117)
ALT SERPL-CCNC: 15 U/L (ref 12–78)
AMPHET UR QL SCN: NEGATIVE
ANION GAP SERPL CALC-SCNC: 4 MMOL/L (ref 5–15)
APPEARANCE UR: ABNORMAL
AST SERPL W P-5'-P-CCNC: 14 U/L (ref 15–37)
BACTERIA URNS QL MICRO: NEGATIVE /HPF
BARBITURATES UR QL SCN: NEGATIVE
BASOPHILS # BLD: 0.1 K/UL (ref 0–0.1)
BASOPHILS NFR BLD: 1 % (ref 0–1)
BENZODIAZ UR QL: POSITIVE
BILIRUB SERPL-MCNC: 0.3 MG/DL (ref 0.2–1)
BILIRUB UR QL CFM: NEGATIVE
BILIRUB UR QL: ABNORMAL
BUN SERPL-MCNC: 5 MG/DL (ref 6–20)
BUN/CREAT SERPL: 6 (ref 12–20)
CA-I BLD-MCNC: 8.5 MG/DL (ref 8.5–10.1)
CANNABINOIDS UR QL SCN: NEGATIVE
CHLORIDE SERPL-SCNC: 107 MMOL/L (ref 97–108)
CO2 SERPL-SCNC: 26 MMOL/L (ref 21–32)
COCAINE UR QL SCN: NEGATIVE
COLOR UR: ABNORMAL
CREAT SERPL-MCNC: 0.83 MG/DL (ref 0.55–1.02)
DIFFERENTIAL METHOD BLD: NORMAL
DRUG SCRN COMMENT,DRGCM: ABNORMAL
EOSINOPHIL # BLD: 0.1 K/UL (ref 0–0.4)
EOSINOPHIL NFR BLD: 1 % (ref 0–7)
EPITH CASTS URNS QL MICRO: ABNORMAL /LPF
ERYTHROCYTE [DISTWIDTH] IN BLOOD BY AUTOMATED COUNT: 13.4 % (ref 11.5–14.5)
GLOBULIN SER CALC-MCNC: 4 G/DL (ref 2–4)
GLUCOSE SERPL-MCNC: 103 MG/DL (ref 65–100)
GLUCOSE UR STRIP.AUTO-MCNC: NEGATIVE MG/DL
HCT VFR BLD AUTO: 36.5 % (ref 35–47)
HGB BLD-MCNC: 11.9 G/DL (ref 11.5–16)
HGB UR QL STRIP: ABNORMAL
HYALINE CASTS URNS QL MICRO: ABNORMAL /LPF (ref 0–5)
IMM GRANULOCYTES # BLD AUTO: 0 K/UL (ref 0–0.04)
IMM GRANULOCYTES NFR BLD AUTO: 0 % (ref 0–0.5)
KETONES UR QL STRIP.AUTO: NEGATIVE MG/DL
LEUKOCYTE ESTERASE UR QL STRIP.AUTO: NEGATIVE
LYMPHOCYTES # BLD: 2.4 K/UL (ref 0.8–3.5)
LYMPHOCYTES NFR BLD: 25 % (ref 12–49)
MCH RBC QN AUTO: 29.8 PG (ref 26–34)
MCHC RBC AUTO-ENTMCNC: 32.6 G/DL (ref 30–36.5)
MCV RBC AUTO: 91.5 FL (ref 80–99)
METHADONE UR QL: NEGATIVE
MONOCYTES # BLD: 0.5 K/UL (ref 0–1)
MONOCYTES NFR BLD: 6 % (ref 5–13)
MUCOUS THREADS URNS QL MICRO: ABNORMAL /LPF
NEUTS SEG # BLD: 6.4 K/UL (ref 1.8–8)
NEUTS SEG NFR BLD: 67 % (ref 32–75)
NITRITE UR QL STRIP.AUTO: NEGATIVE
NRBC # BLD: 0 K/UL (ref 0–0.01)
NRBC BLD-RTO: 0 PER 100 WBC
OPIATES UR QL: POSITIVE
PCP UR QL: NEGATIVE
PH UR STRIP: 6 (ref 5–8)
PLATELET # BLD AUTO: 324 K/UL (ref 150–400)
PMV BLD AUTO: 9.3 FL (ref 8.9–12.9)
POTASSIUM SERPL-SCNC: 3.4 MMOL/L (ref 3.5–5.1)
PROT SERPL-MCNC: 7.1 G/DL (ref 6.4–8.2)
PROT UR STRIP-MCNC: 30 MG/DL
RBC # BLD AUTO: 3.99 M/UL (ref 3.8–5.2)
RBC #/AREA URNS HPF: ABNORMAL /HPF (ref 0–5)
SODIUM SERPL-SCNC: 137 MMOL/L (ref 136–145)
SP GR UR REFRACTOMETRY: 1.02 (ref 1–1.03)
UA: UC IF INDICATED,UAUC: ABNORMAL
UROBILINOGEN UR QL STRIP.AUTO: 2 EU/DL (ref 0.1–1)
WBC # BLD AUTO: 9.5 K/UL (ref 3.6–11)
WBC URNS QL MICRO: ABNORMAL /HPF (ref 0–4)

## 2023-05-01 PROCEDURE — 85025 COMPLETE CBC W/AUTO DIFF WBC: CPT

## 2023-05-01 PROCEDURE — 87086 URINE CULTURE/COLONY COUNT: CPT

## 2023-05-01 PROCEDURE — 65270000029 HC RM PRIVATE

## 2023-05-01 PROCEDURE — 80307 DRUG TEST PRSMV CHEM ANLYZR: CPT

## 2023-05-01 PROCEDURE — 80177 DRUG SCRN QUAN LEVETIRACETAM: CPT

## 2023-05-01 PROCEDURE — 74011250636 HC RX REV CODE- 250/636: Performed by: EMERGENCY MEDICINE

## 2023-05-01 PROCEDURE — 80053 COMPREHEN METABOLIC PANEL: CPT

## 2023-05-01 PROCEDURE — 36415 COLL VENOUS BLD VENIPUNCTURE: CPT

## 2023-05-01 PROCEDURE — 81001 URINALYSIS AUTO W/SCOPE: CPT

## 2023-05-01 PROCEDURE — 93005 ELECTROCARDIOGRAM TRACING: CPT

## 2023-05-01 PROCEDURE — 70450 CT HEAD/BRAIN W/O DYE: CPT

## 2023-05-01 PROCEDURE — 99285 EMERGENCY DEPT VISIT HI MDM: CPT

## 2023-05-01 RX ORDER — ACETAMINOPHEN 325 MG/1
650 TABLET ORAL
Status: DISCONTINUED | OUTPATIENT
Start: 2023-05-01 | End: 2023-05-02

## 2023-05-01 RX ORDER — IBUPROFEN 600 MG/1
600 TABLET ORAL
Status: DISPENSED | OUTPATIENT
Start: 2023-05-01 | End: 2023-05-02

## 2023-05-01 RX ADMIN — SODIUM CHLORIDE 1000 ML: 9 INJECTION, SOLUTION INTRAVENOUS at 20:48

## 2023-05-02 ENCOUNTER — APPOINTMENT (OUTPATIENT)
Dept: CT IMAGING | Age: 48
End: 2023-05-02
Attending: FAMILY MEDICINE
Payer: COMMERCIAL

## 2023-05-02 LAB
ATRIAL RATE: 69 BPM
ATRIAL RATE: 70 BPM
CALCULATED P AXIS, ECG09: 60 DEGREES
CALCULATED P AXIS, ECG09: 66 DEGREES
CALCULATED R AXIS, ECG10: 48 DEGREES
CALCULATED R AXIS, ECG10: 60 DEGREES
CALCULATED T AXIS, ECG11: 44 DEGREES
CALCULATED T AXIS, ECG11: 59 DEGREES
DIAGNOSIS, 93000: NORMAL
DIAGNOSIS, 93000: NORMAL
LACTATE SERPL-SCNC: 0.6 MMOL/L (ref 0.4–2)
P-R INTERVAL, ECG05: 140 MS
P-R INTERVAL, ECG05: 96 MS
Q-T INTERVAL, ECG07: 426 MS
Q-T INTERVAL, ECG07: 460 MS
QRS DURATION, ECG06: 100 MS
QRS DURATION, ECG06: 84 MS
QTC CALCULATION (BEZET), ECG08: 456 MS
QTC CALCULATION (BEZET), ECG08: 496 MS
TROPONIN I SERPL HS-MCNC: 4 NG/L (ref 0–51)
TROPONIN I SERPL HS-MCNC: 5 NG/L (ref 0–51)
TROPONIN I SERPL HS-MCNC: 5 NG/L (ref 0–51)
VENTRICULAR RATE, ECG03: 69 BPM
VENTRICULAR RATE, ECG03: 70 BPM

## 2023-05-02 PROCEDURE — 93005 ELECTROCARDIOGRAM TRACING: CPT

## 2023-05-02 PROCEDURE — 84484 ASSAY OF TROPONIN QUANT: CPT

## 2023-05-02 PROCEDURE — 74011250636 HC RX REV CODE- 250/636: Performed by: FAMILY MEDICINE

## 2023-05-02 PROCEDURE — 83605 ASSAY OF LACTIC ACID: CPT

## 2023-05-02 PROCEDURE — 65270000029 HC RM PRIVATE

## 2023-05-02 PROCEDURE — 74011250637 HC RX REV CODE- 250/637: Performed by: FAMILY MEDICINE

## 2023-05-02 PROCEDURE — 36415 COLL VENOUS BLD VENIPUNCTURE: CPT

## 2023-05-02 PROCEDURE — 74011250637 HC RX REV CODE- 250/637: Performed by: EMERGENCY MEDICINE

## 2023-05-02 RX ORDER — ALPRAZOLAM 0.5 MG/1
1 TABLET ORAL
Status: DISCONTINUED | OUTPATIENT
Start: 2023-05-02 | End: 2023-05-02

## 2023-05-02 RX ORDER — ACETAMINOPHEN 650 MG/1
650 SUPPOSITORY RECTAL
Status: DISCONTINUED | OUTPATIENT
Start: 2023-05-02 | End: 2023-05-05 | Stop reason: HOSPADM

## 2023-05-02 RX ORDER — CARBAMAZEPINE 200 MG/1
800 CAPSULE, EXTENDED RELEASE ORAL DAILY
Status: DISCONTINUED | OUTPATIENT
Start: 2023-05-02 | End: 2023-05-05 | Stop reason: HOSPADM

## 2023-05-02 RX ORDER — HEPARIN SODIUM 5000 [USP'U]/ML
5000 INJECTION, SOLUTION INTRAVENOUS; SUBCUTANEOUS EVERY 8 HOURS
Status: DISCONTINUED | OUTPATIENT
Start: 2023-05-02 | End: 2023-05-05 | Stop reason: HOSPADM

## 2023-05-02 RX ORDER — SODIUM CHLORIDE 9 MG/ML
75 INJECTION, SOLUTION INTRAVENOUS CONTINUOUS
Status: DISCONTINUED | OUTPATIENT
Start: 2023-05-02 | End: 2023-05-03

## 2023-05-02 RX ORDER — POLYETHYLENE GLYCOL 3350 17 G/17G
17 POWDER, FOR SOLUTION ORAL DAILY PRN
Status: DISCONTINUED | OUTPATIENT
Start: 2023-05-02 | End: 2023-05-05 | Stop reason: HOSPADM

## 2023-05-02 RX ORDER — GABAPENTIN 300 MG/1
300 CAPSULE ORAL 3 TIMES DAILY
Status: DISCONTINUED | OUTPATIENT
Start: 2023-05-02 | End: 2023-05-05 | Stop reason: HOSPADM

## 2023-05-02 RX ORDER — PAROXETINE HYDROCHLORIDE 20 MG/1
40 TABLET, FILM COATED ORAL DAILY
Status: DISCONTINUED | OUTPATIENT
Start: 2023-05-02 | End: 2023-05-05 | Stop reason: HOSPADM

## 2023-05-02 RX ORDER — ONDANSETRON 2 MG/ML
4 INJECTION INTRAMUSCULAR; INTRAVENOUS
Status: DISCONTINUED | OUTPATIENT
Start: 2023-05-02 | End: 2023-05-05 | Stop reason: HOSPADM

## 2023-05-02 RX ORDER — ACETAMINOPHEN 325 MG/1
650 TABLET ORAL
Status: DISCONTINUED | OUTPATIENT
Start: 2023-05-02 | End: 2023-05-05 | Stop reason: HOSPADM

## 2023-05-02 RX ORDER — LEVETIRACETAM 500 MG/5ML
1000 INJECTION, SOLUTION, CONCENTRATE INTRAVENOUS EVERY 12 HOURS
Status: DISCONTINUED | OUTPATIENT
Start: 2023-05-02 | End: 2023-05-05 | Stop reason: HOSPADM

## 2023-05-02 RX ORDER — LEVETIRACETAM 250 MG/1
750 TABLET ORAL 2 TIMES DAILY
Status: DISCONTINUED | OUTPATIENT
Start: 2023-05-02 | End: 2023-05-02

## 2023-05-02 RX ORDER — ONDANSETRON 4 MG/1
4 TABLET, ORALLY DISINTEGRATING ORAL
Status: DISCONTINUED | OUTPATIENT
Start: 2023-05-02 | End: 2023-05-05 | Stop reason: HOSPADM

## 2023-05-02 RX ORDER — LORAZEPAM 2 MG/ML
1 INJECTION INTRAMUSCULAR
Status: DISCONTINUED | OUTPATIENT
Start: 2023-05-02 | End: 2023-05-05 | Stop reason: HOSPADM

## 2023-05-02 RX ADMIN — HEPARIN SODIUM 5000 UNITS: 5000 INJECTION INTRAVENOUS; SUBCUTANEOUS at 21:00

## 2023-05-02 RX ADMIN — ALPRAZOLAM 1 MG: 0.5 TABLET ORAL at 08:56

## 2023-05-02 RX ADMIN — HEPARIN SODIUM 5000 UNITS: 5000 INJECTION INTRAVENOUS; SUBCUTANEOUS at 07:56

## 2023-05-02 RX ADMIN — LORAZEPAM 1 MG: 2 INJECTION INTRAMUSCULAR; INTRAVENOUS at 23:18

## 2023-05-02 RX ADMIN — GABAPENTIN 300 MG: 300 CAPSULE ORAL at 15:40

## 2023-05-02 RX ADMIN — LEVETIRACETAM 1000 MG: 100 INJECTION, SOLUTION INTRAVENOUS at 19:09

## 2023-05-02 RX ADMIN — LEVETIRACETAM 1000 MG: 100 INJECTION, SOLUTION INTRAVENOUS at 07:55

## 2023-05-02 RX ADMIN — OXYCODONE HYDROCHLORIDE 15 MG: 10 TABLET ORAL at 08:56

## 2023-05-02 RX ADMIN — SODIUM CHLORIDE 75 ML/HR: 9 INJECTION, SOLUTION INTRAVENOUS at 20:50

## 2023-05-02 RX ADMIN — PAROXETINE HYDROCHLORIDE 40 MG: 20 TABLET, FILM COATED ORAL at 15:41

## 2023-05-02 RX ADMIN — ACETAMINOPHEN 650 MG: 325 TABLET ORAL at 02:32

## 2023-05-02 RX ADMIN — IBUPROFEN 600 MG: 600 TABLET, FILM COATED ORAL at 02:32

## 2023-05-02 RX ADMIN — LORAZEPAM 1 MG: 2 INJECTION INTRAMUSCULAR; INTRAVENOUS at 08:20

## 2023-05-02 RX ADMIN — CARBAMAZEPINE 800 MG: 200 CAPSULE, EXTENDED RELEASE ORAL at 14:00

## 2023-05-02 RX ADMIN — GABAPENTIN 300 MG: 300 CAPSULE ORAL at 08:56

## 2023-05-02 RX ADMIN — LORAZEPAM 1 MG: 2 INJECTION INTRAMUSCULAR; INTRAVENOUS at 16:33

## 2023-05-02 RX ADMIN — ONDANSETRON 4 MG: 2 INJECTION INTRAMUSCULAR; INTRAVENOUS at 08:56

## 2023-05-02 RX ADMIN — OXYCODONE HYDROCHLORIDE 15 MG: 10 TABLET ORAL at 16:00

## 2023-05-02 RX ADMIN — GABAPENTIN 300 MG: 300 CAPSULE ORAL at 21:00

## 2023-05-02 NOTE — ED PROVIDER NOTES
[address] MED/SURG  EMERGENCY DEPARTMENT HISTORY AND PHYSICAL EXAM      Date: 5/1/2023  Patient Name: Michelle Alcantara  MRN: [id number]  YOB: 1975  Date of evaluation: 5/1/2023  Provider: David De La Paz MD   Note Started: 8:32 PM 5/1/23    HISTORY OF PRESENT ILLNESS     Chief Complaint   Patient presents with    Seizure       History Provided By: Patient    HPI: Michelle Alcantara is a 53 y.o. female past medical history significant for seizures presents with 2 seizures with inability to recover between. EMS has arrived the patient without any treatment the exception of Versed prior to arrival.  They state that she has been hemodynamically stable. Patient's not able to provide any history    PAST MEDICAL HISTORY   Past Medical History:  Past Medical History:   Diagnosis Date    Arthritis     rhumatoid arthritis    Cervical cancer ([facility] [address] 1999    Ill-defined condition     migraine ha    Ill-defined condition     fibromyalgia    Psychiatric disorder     depression,panic attacks    Seizures ([facility] [address]     Petie mal and gran mal seizures, last on March 28th    Wrist fracture, right 8/2015       Past Surgical History:  Past Surgical History:   Procedure Laterality Date    HX HEENT  Child    Tonsillectomy and Adenoidectomy    HX HYSTERECTOMY  1999    HX ORTHOPAEDIC  8/2015    Repair Right Wrist Fracture    HX ORTHOPAEDIC  11/2015    Removal hardware from Right Wrist    HX WISDOM TEETH EXTRACTION         Family History:  History reviewed. No pertinent family history. Social History:  Social History     Tobacco Use    Smoking status: Every Day     Packs/day: 1.00     Years: 23.00     Pack years: 23.00     Types: Cigarettes    Smokeless tobacco: Never   Vaping Use    Vaping Use: Some days   Substance Use Topics    Alcohol use: No    Drug use: Not Currently       Allergies:   Allergies   Allergen Reactions    Seafood Anaphylaxis    Penicillins Hives and Swelling       PCP: Chiki Jaramillo PA-C    Current Meds: Current Discharge Medication List        CONTINUE these medications which have NOT CHANGED    Details   oxyCODONE IR (OXY-IR) 15 mg immediate release tablet Take 1 Tab by mouth every four (4) hours as needed for Pain. Max Daily Amount: 90 mg.  Qty: 20 Tab, Refills: 0    Associated Diagnoses: Arthritis of right wrist      gabapentin (NEURONTIN) 300 mg capsule Take 1 Capsule by mouth three (3) times daily. ALPRAZolam (XANAX) 1 mg tablet Take 1 Tablet by mouth three (3) times daily. levETIRAcetam (KEPPRA) 750 mg tablet Take 750 mg by mouth two (2) times a day. carBAMazepine XR (TEGretol XR) 400 mg SR tablet Take 800 mg by mouth once. !! OTHER paxil PO daily, pt does not know dosage      !! OTHER Propanolol PO daily, for migraine headache prevention, patient does not know dosage       !! - Potential duplicate medications found. Please discuss with provider. PHYSICAL EXAM     ED Triage Vitals   ED Encounter Vitals Group      BP 05/01/23 2024 113/63      Pulse (Heart Rate) 05/01/23 2023 70      Resp Rate 05/01/23 2023 8      Temp --       Temp src --       O2 Sat (%) 05/01/23 2024 (!) 88 %      Weight --       Height --       Physical Exam  Vitals and nursing note reviewed. Constitutional:       General: She is not in acute distress. Appearance: She is well-developed. She is ill-appearing. HENT:      Head: Normocephalic and atraumatic. Nose: Nose normal.      Mouth/Throat:      Mouth: Mucous membranes are moist.      Pharynx: Oropharynx is clear. No oropharyngeal exudate. Eyes:      General:         Right eye: No discharge. Left eye: No discharge. Conjunctiva/sclera: Conjunctivae normal.      Pupils: Pupils are equal, round, and reactive to light. Cardiovascular:      Rate and Rhythm: Normal rate and regular rhythm. Chest Wall: PMI is not displaced. No thrill. Heart sounds: Normal heart sounds. No murmur heard. No friction rub. No gallop. Pulmonary:      Effort: Pulmonary effort is normal. No respiratory distress. Breath sounds: Normal breath sounds. No wheezing or rales. Chest:      Chest wall: No tenderness. Abdominal:      General: Bowel sounds are normal. There is no distension. Palpations: Abdomen is soft. There is no mass. Tenderness: There is no abdominal tenderness. There is no guarding or rebound. Musculoskeletal:         General: Normal range of motion. Cervical back: Normal range of motion and neck supple. Lymphadenopathy:      Cervical: No cervical adenopathy. Skin:     General: Skin is warm and dry. Capillary Refill: Capillary refill takes less than 2 seconds. Findings: No erythema or rash. Neurological:      Mental Status: She is disoriented. Cranial Nerves: No cranial nerve deficit.       Coordination: Coordination normal.   Psychiatric:         Mood and Affect: Mood normal.         Behavior: Behavior normal.         SCREENINGS              LAB, EKG AND DIAGNOSTIC RESULTS   Labs:  Recent Results (from the past 12 hour(s))   ECHO ADULT COMPLETE    Collection Time: 05/04/23  2:30 PM   Result Value Ref Range    LV EDV A2C 50 mL    LV EDV A4C 46 mL    LV ESV A2C 14 mL    LV ESV A4C 6 mL    IVSd 0.9 0.6 - 0.9 cm    LVIDd 4.2 3.9 - 5.3 cm    LVIDs 2.5 cm    LVOT Diameter 1.6 cm    LVOT Mean Gradient 4 mmHg    LVOT VTI 25.9 cm    LVOT Peak Velocity 1.3 m/s    LVOT Peak Gradient 7 mmHg    LVPWd 1.1 (A) 0.6 - 0.9 cm    LV Ejection Fraction A2C 73 %    LV Ejection Fraction A4C 88 %    LVOT Area 2.0 cm2    LVOT SV 52.0 ml    LA Minor Axis 4.3 cm    LA Major [place] 4.5 cm    LA Area 2C 14.6 cm2    LA Area 4C 9.9 cm2    LA Volume BP 25 22 - 52 mL    LA Diameter 2.8 cm    RA Area 4C 7.0 cm2    RA Volume 11 ml    AV Mean Gradient 5 mmHg    AV VTI 27.9 cm    AV Mean Velocity 1.0 m/s    AV Peak Velocity 1.7 m/s    AV Peak Gradient 11 mmHg    AV Area by VTI 1.9 cm2    AV Area by Peak Velocity 1.6 cm2    MV A Velocity 0.89 m/s    MV E Velocity 0.66 m/s    PV Max Velocity 0.5 m/s    PV Peak Gradient 1 mmHg    RV Basal Dimension 3.0 cm    RV Mid Dimension 2.1 cm    TAPSE 2.1 1.7 cm    TR Max Velocity 1.02 m/s    TR Peak Gradient 4 mmHg    Fractional Shortening 2D 40 28 - 44 %    LV ESV Index A4C 3 mL/m2    LV EDV Index A4C 24 mL/m2    LV ESV Index A2C 7 mL/m2    LV EDV Index A2C 26 mL/m2    LVIDd Index 2.19 cm/m2    LVIDs Index 1.30 cm/m2    LV RWT Ratio 0.52     LV Mass 2D 137.2 67 - 162 g    LV Mass 2D Index 71.5 43 - 95 g/m2    MV E/A 0.74     LA Volume Index BP 13 (A) 16 - 34 ml/m2    LVOT Stroke Volume Index 27.1 mL/m2    LA Size Index 1.46 cm/m2    RA Volume Index A4C 6 mL/m2    AV Velocity Ratio 0.76     LVOT:AV VTI Index 0.93     BONILLA/BSA VTI 1.0 cm2/m2    BONILLA/BSA Peak Velocity 0.8 cm2/m2    RV Free Wall Peak S' 11 cm/s    Est. RA Pressure 3 mmHg    RVSP 7 mmHg     Radiologic Studies:  Non-plain film images such as CT, Ultrasound and MRI are read by the radiologist. Plain radiographic images are visualized and preliminarily interpreted by the ED Physician with the following findings: CT shows no appreciable acute process on my independent evaluation    Interpretation per the Radiologist below, if available at the time of this note:  CT CHEST WO CONT    Result Date: 5/3/2023  INDICATION: Chest pain COMPARISON: None CONTRAST: None. TECHNIQUE:  5 mm axial images were obtained through the chest. Coronal and sagittal reformats were generated. CT dose reduction was achieved through use of a standardized protocol tailored for this examination and automatic exposure control for dose modulation. The absence of intravenous contrast reduces the sensitivity for evaluation of the mediastinum, bismark, vasculature, and upper abdominal organs. FINDINGS: CHEST WALL: No mass or axillary lymphadenopathy. Bilateral breast implants. THYROID: No nodule. MEDIASTINUM: No mass or lymphadenopathy. BISMARK: No mass or lymphadenopathy.  THORACIC AORTA: No aneurysm. Atherosclerosis. MAIN PULMONARY ARTERY: Normal in caliber. TRACHEA/BRONCHI: Patent. ESOPHAGUS: No wall thickening or dilatation. HEART: Normal in size. Coronary artery calcium: absent PLEURA: Small left pleural effusion. LUNGS: Moderate emphysema. Diffuse smooth septal thickening and faint groundglass, compatible with edema. INCIDENTALLY IMAGED UPPER ABDOMEN: No significant abnormality in the incidentally imaged upper abdomen. BONES: No destructive bone lesion. Chronic appearing T7-T9 compression deformities. Chronic-appearing sternomanubrial deformity. Chronic nonunited left anterior second and third rib deformities. Age-indeterminate left anterior third rib fracture. Chronic posterolateral right 5th-7th rib fracture deformities. Degenerative changes. 1.  Age-indeterminate left anterior third rib fracture. Several additional chronic bilateral rib fractures as above. Chronic sternomanubrial deformity. Chronic T7-T9 compression deformities. 2.  Pulmonary edema pattern with small left pleural effusion. 3.  Nonspecific small left pericardial effusion. 4.  Moderate emphysema. ED COURSE and DIFFERENTIAL DIAGNOSIS/MDM   CC/HPI/PE Summary, DDx: 50-year-old female with complications from repeated seizure activity. We will be admitting to the hospital for further evaluation. Records Reviewed (source and summary of external notes): Prior medical records and Nursing notes    Vitals:    Vitals:    05/03/23 1913 05/03/23 2249 05/04/23 0745 05/04/23 1253   BP: 135/81 135/82 118/62 (!) 101/57   Pulse: 69 64 62 60   Resp: 18 18 19 18   Temp: 98.9 °F (37.2 °C) 99 °F (37.2 °C) 98.6 °F (37 °C) 99.6 °F (37.6 °C)   SpO2: 93% 94% 92% 95%   Weight:       Height:            ED COURSE       Disposition Considerations (Tests not done, Shared Decision Making, Pt Expectation of Test or Treatment.):  No evidence of any acute neurologic pathology.     Patient was given the following medications:  Medications ibuprofen (MOTRIN) tablet 600 mg (600 mg Oral Given 5/2/23 0232)   acetaminophen (TYLENOL) tablet 650 mg (650 mg Oral Given 5/4/23 1301)     Or   acetaminophen (TYLENOL) suppository 650 mg ( Rectal See Alternative 5/4/23 1301)   polyethylene glycol (MIRALAX) packet 17 g (has no administration in time range)   ondansetron (ZOFRAN ODT) tablet 4 mg (4 mg Oral Given 5/4/23 0549)     Or   ondansetron (ZOFRAN) injection 4 mg ( IntraVENous See Alternative 5/4/23 0549)   heparin (porcine) injection 5,000 Units (5,000 Units SubCUTAneous Given 5/4/23 0535)   LORazepam (ATIVAN) injection 1 mg (1 mg IntraVENous Given 5/2/23 2318)   levETIRAcetam (KEPPRA) injection 1,000 mg (1,000 mg IntraVENous Given 5/4/23 0535)   carBAMazepine ER (CARBATROL ER) capsule 800 mg ( Oral Automatically Held 5/16/23 0900)   gabapentin (NEURONTIN) capsule 300 mg (300 mg Oral Given 5/4/23 0932)   PARoxetine (PAXIL) tablet 40 mg ( Oral Automatically Held 5/16/23 0900)   saline peripheral flush soln 10 mL (has no administration in time range)   sodium chloride (NS) flush 5 mL (5 mL InterCATHeter Given 5/4/23 0536)   PARoxetine (PAXIL) tablet 20 mg (20 mg Oral Given 5/4/23 0932)   ALPRAZolam (XANAX) tablet 0.25 mg (0.25 mg Oral Given 5/3/23 2345)   potassium chloride (KLOR-CON) packet for solution 40 mEq (40 mEq Oral Refused 5/4/23 0932)   oxyCODONE IR (ROXICODONE) tablet 5 mg (5 mg Oral Given 5/4/23 0348)   furosemide (LASIX) injection 40 mg (40 mg IntraVENous Given 5/3/23 2249)   lidocaine 4 % patch 1 Patch (1 Patch TransDERmal Remove Patch 5/4/23 1130)   sodium chloride 0.9 % bolus infusion 1,000 mL (0 mL IntraVENous IV Completed 5/1/23 2157)   potassium chloride SR (KLOR-CON 10) tablet 40 mEq (40 mEq Oral Given 5/4/23 9520)       CONSULTS: (Who and What was discussed)  IP CONSULT TO NEUROLOGY  IP CONSULT TO PSYCHIATRY     Social Determinants affecting Dx or Tx: None    Smoking Cessation: Not Applicable    PROCEDURES   Unless otherwise noted above, none.  Procedures      CRITICAL CARE TIME   Patient does not meet Critical Care Time, 0 minutes    FINAL IMPRESSION   No diagnosis found. DISPOSITION/PLAN   Admitted    Admit Note: Pt is being admitted by Dr. Griffin Scheuermann. The results of their tests and reason(s) for their admission have been discussed with pt and/or available family. They convey agreement and understanding for the need to be admitted and for the admission diagnosis. PATIENT REFERRED TO:  Follow-up Information    None           DISCHARGE MEDICATIONS:  Current Discharge Medication List            DISCONTINUED MEDICATIONS:  Current Discharge Medication List          I am the Primary Clinician of Record: Jeff Castro MD (electronically signed)    (Please note that parts of this dictation were completed with voice recognition software. Quite often unanticipated grammatical, syntax, homophones, and other interpretive errors are inadvertently transcribed by the computer software. Please disregards these errors.  Please excuse any errors that have escaped final proofreading.)

## 2023-05-02 NOTE — ED TRIAGE NOTES
Family called EMS for seizure like activity, patient was down seizing for approx 10 - 12 minutes on EMS arrival patient was actively seizing. They have 5 mg IM versed, about 3 minutes later she stopped seizing, while en route her respiratory rate dropped to 4 - 5 a minute maintaining saturations EMS began bagging her patient snoring on arrival to the ED.

## 2023-05-03 ENCOUNTER — APPOINTMENT (OUTPATIENT)
Dept: CT IMAGING | Age: 48
End: 2023-05-03
Attending: FAMILY MEDICINE
Payer: COMMERCIAL

## 2023-05-03 LAB
ALBUMIN SERPL-MCNC: 3 G/DL (ref 3.5–5)
ALBUMIN/GLOB SERPL: 0.9 (ref 1.1–2.2)
ALP SERPL-CCNC: 77 U/L (ref 45–117)
ALT SERPL-CCNC: 15 U/L (ref 12–78)
AMMONIA PLAS-SCNC: 15 UMOL/L
ANION GAP SERPL CALC-SCNC: 5 MMOL/L (ref 5–15)
AST SERPL W P-5'-P-CCNC: 11 U/L (ref 15–37)
ATRIAL RATE: 58 BPM
ATRIAL RATE: 66 BPM
BASOPHILS # BLD: 0.1 K/UL (ref 0–0.1)
BASOPHILS NFR BLD: 1 % (ref 0–1)
BILIRUB SERPL-MCNC: 0.4 MG/DL (ref 0.2–1)
BUN SERPL-MCNC: 5 MG/DL (ref 6–20)
BUN/CREAT SERPL: 9 (ref 12–20)
CA-I BLD-MCNC: 8 MG/DL (ref 8.5–10.1)
CALCULATED P AXIS, ECG09: 71 DEGREES
CALCULATED P AXIS, ECG09: 75 DEGREES
CALCULATED R AXIS, ECG10: 64 DEGREES
CALCULATED R AXIS, ECG10: 77 DEGREES
CALCULATED T AXIS, ECG11: 44 DEGREES
CALCULATED T AXIS, ECG11: 56 DEGREES
CHLORIDE SERPL-SCNC: 106 MMOL/L (ref 97–108)
CO2 SERPL-SCNC: 26 MMOL/L (ref 21–32)
CREAT SERPL-MCNC: 0.58 MG/DL (ref 0.55–1.02)
DIAGNOSIS, 93000: NORMAL
DIAGNOSIS, 93000: NORMAL
DIFFERENTIAL METHOD BLD: ABNORMAL
EOSINOPHIL # BLD: 0.1 K/UL (ref 0–0.4)
EOSINOPHIL NFR BLD: 1 % (ref 0–7)
ERYTHROCYTE [DISTWIDTH] IN BLOOD BY AUTOMATED COUNT: 13.2 % (ref 11.5–14.5)
GLOBULIN SER CALC-MCNC: 3.5 G/DL (ref 2–4)
GLUCOSE SERPL-MCNC: 92 MG/DL (ref 65–100)
HCT VFR BLD AUTO: 33.5 % (ref 35–47)
HGB BLD-MCNC: 11.1 G/DL (ref 11.5–16)
IMM GRANULOCYTES # BLD AUTO: 0 K/UL (ref 0–0.04)
IMM GRANULOCYTES NFR BLD AUTO: 0 % (ref 0–0.5)
LEVETIRACETAM SERPL-MCNC: <2 UG/ML (ref 10–40)
LYMPHOCYTES # BLD: 2.5 K/UL (ref 0.8–3.5)
LYMPHOCYTES NFR BLD: 31 % (ref 12–49)
MCH RBC QN AUTO: 29.8 PG (ref 26–34)
MCHC RBC AUTO-ENTMCNC: 33.1 G/DL (ref 30–36.5)
MCV RBC AUTO: 89.8 FL (ref 80–99)
MONOCYTES # BLD: 0.5 K/UL (ref 0–1)
MONOCYTES NFR BLD: 7 % (ref 5–13)
NEUTS SEG # BLD: 4.9 K/UL (ref 1.8–8)
NEUTS SEG NFR BLD: 60 % (ref 32–75)
NRBC # BLD: 0 K/UL (ref 0–0.01)
NRBC BLD-RTO: 0 PER 100 WBC
P-R INTERVAL, ECG05: 126 MS
P-R INTERVAL, ECG05: 132 MS
PLATELET # BLD AUTO: 296 K/UL (ref 150–400)
PMV BLD AUTO: 10.4 FL (ref 8.9–12.9)
POTASSIUM SERPL-SCNC: 2.9 MMOL/L (ref 3.5–5.1)
PROLACTIN SERPL-MCNC: 7.9 NG/ML
PROT SERPL-MCNC: 6.5 G/DL (ref 6.4–8.2)
Q-T INTERVAL, ECG07: 440 MS
Q-T INTERVAL, ECG07: 478 MS
QRS DURATION, ECG06: 100 MS
QRS DURATION, ECG06: 92 MS
QTC CALCULATION (BEZET), ECG08: 461 MS
QTC CALCULATION (BEZET), ECG08: 469 MS
RBC # BLD AUTO: 3.73 M/UL (ref 3.8–5.2)
SODIUM SERPL-SCNC: 137 MMOL/L (ref 136–145)
VENTRICULAR RATE, ECG03: 58 BPM
VENTRICULAR RATE, ECG03: 66 BPM
WBC # BLD AUTO: 8.1 K/UL (ref 3.6–11)

## 2023-05-03 PROCEDURE — 84146 ASSAY OF PROLACTIN: CPT

## 2023-05-03 PROCEDURE — 95816 EEG AWAKE AND DROWSY: CPT | Performed by: PSYCHIATRY & NEUROLOGY

## 2023-05-03 PROCEDURE — 85025 COMPLETE CBC W/AUTO DIFF WBC: CPT

## 2023-05-03 PROCEDURE — 36415 COLL VENOUS BLD VENIPUNCTURE: CPT

## 2023-05-03 PROCEDURE — 65270000029 HC RM PRIVATE

## 2023-05-03 PROCEDURE — 74011250637 HC RX REV CODE- 250/637: Performed by: FAMILY MEDICINE

## 2023-05-03 PROCEDURE — 74011000250 HC RX REV CODE- 250: Performed by: FAMILY MEDICINE

## 2023-05-03 PROCEDURE — 74011250636 HC RX REV CODE- 250/636: Performed by: FAMILY MEDICINE

## 2023-05-03 PROCEDURE — 71250 CT THORAX DX C-: CPT

## 2023-05-03 PROCEDURE — 36410 VNPNXR 3YR/> PHY/QHP DX/THER: CPT

## 2023-05-03 PROCEDURE — 74011250637 HC RX REV CODE- 250/637: Performed by: PSYCHIATRY & NEUROLOGY

## 2023-05-03 PROCEDURE — 02HV33Z INSERTION OF INFUSION DEVICE INTO SUPERIOR VENA CAVA, PERCUTANEOUS APPROACH: ICD-10-PCS | Performed by: FAMILY MEDICINE

## 2023-05-03 PROCEDURE — 82140 ASSAY OF AMMONIA: CPT

## 2023-05-03 PROCEDURE — 80053 COMPREHEN METABOLIC PANEL: CPT

## 2023-05-03 RX ORDER — FUROSEMIDE 10 MG/ML
40 INJECTION INTRAMUSCULAR; INTRAVENOUS DAILY
Status: DISCONTINUED | OUTPATIENT
Start: 2023-05-03 | End: 2023-05-05 | Stop reason: HOSPADM

## 2023-05-03 RX ORDER — SODIUM CHLORIDE 0.9 % (FLUSH) 0.9 %
10 SYRINGE (ML) INJECTION AS NEEDED
Status: DISCONTINUED | OUTPATIENT
Start: 2023-05-03 | End: 2023-05-05 | Stop reason: HOSPADM

## 2023-05-03 RX ORDER — PAROXETINE HYDROCHLORIDE 20 MG/1
20 TABLET, FILM COATED ORAL DAILY
Status: DISCONTINUED | OUTPATIENT
Start: 2023-05-03 | End: 2023-05-05 | Stop reason: HOSPADM

## 2023-05-03 RX ORDER — LIDOCAINE 4 G/100G
1 PATCH TOPICAL EVERY 24 HOURS
Status: DISCONTINUED | OUTPATIENT
Start: 2023-05-03 | End: 2023-05-05 | Stop reason: HOSPADM

## 2023-05-03 RX ORDER — ALPRAZOLAM 0.25 MG/1
0.25 TABLET ORAL
Status: DISCONTINUED | OUTPATIENT
Start: 2023-05-03 | End: 2023-05-05 | Stop reason: HOSPADM

## 2023-05-03 RX ORDER — OXYCODONE HYDROCHLORIDE 5 MG/1
5 TABLET ORAL
Status: DISCONTINUED | OUTPATIENT
Start: 2023-05-03 | End: 2023-05-04

## 2023-05-03 RX ORDER — SODIUM CHLORIDE 0.9 % (FLUSH) 0.9 %
5 SYRINGE (ML) INJECTION EVERY 8 HOURS
Status: DISCONTINUED | OUTPATIENT
Start: 2023-05-03 | End: 2023-05-05 | Stop reason: HOSPADM

## 2023-05-03 RX ORDER — SODIUM CHLORIDE 0.9 % (FLUSH) 0.9 %
5 SYRINGE (ML) INJECTION EVERY 8 HOURS
Status: DISCONTINUED | OUTPATIENT
Start: 2023-05-03 | End: 2023-05-03

## 2023-05-03 RX ORDER — POTASSIUM CHLORIDE 1.5 G/1.77G
40 POWDER, FOR SOLUTION ORAL 2 TIMES DAILY
Status: ACTIVE | OUTPATIENT
Start: 2023-05-03 | End: 2023-05-04

## 2023-05-03 RX ADMIN — CARBAMAZEPINE 800 MG: 200 CAPSULE, EXTENDED RELEASE ORAL at 09:41

## 2023-05-03 RX ADMIN — ALPRAZOLAM 0.25 MG: 0.25 TABLET ORAL at 23:45

## 2023-05-03 RX ADMIN — HEPARIN SODIUM 5000 UNITS: 5000 INJECTION INTRAVENOUS; SUBCUTANEOUS at 21:33

## 2023-05-03 RX ADMIN — HEPARIN SODIUM 5000 UNITS: 5000 INJECTION INTRAVENOUS; SUBCUTANEOUS at 06:19

## 2023-05-03 RX ADMIN — GABAPENTIN 300 MG: 300 CAPSULE ORAL at 21:33

## 2023-05-03 RX ADMIN — POTASSIUM CHLORIDE 40 MEQ: 1.5 POWDER, FOR SOLUTION ORAL at 21:32

## 2023-05-03 RX ADMIN — FUROSEMIDE 40 MG: 10 INJECTION, SOLUTION INTRAMUSCULAR; INTRAVENOUS at 22:49

## 2023-05-03 RX ADMIN — SODIUM CHLORIDE, PRESERVATIVE FREE 5 ML: 5 INJECTION INTRAVENOUS at 17:08

## 2023-05-03 RX ADMIN — SODIUM CHLORIDE, PRESERVATIVE FREE 5 ML: 5 INJECTION INTRAVENOUS at 21:33

## 2023-05-03 RX ADMIN — LEVETIRACETAM 1000 MG: 100 INJECTION, SOLUTION INTRAVENOUS at 06:19

## 2023-05-03 RX ADMIN — OXYCODONE 5 MG: 5 TABLET ORAL at 19:45

## 2023-05-03 RX ADMIN — HEPARIN SODIUM 5000 UNITS: 5000 INJECTION INTRAVENOUS; SUBCUTANEOUS at 17:06

## 2023-05-03 RX ADMIN — LEVETIRACETAM 1000 MG: 100 INJECTION, SOLUTION INTRAVENOUS at 17:43

## 2023-05-03 RX ADMIN — PAROXETINE HYDROCHLORIDE 20 MG: 20 TABLET, FILM COATED ORAL at 17:07

## 2023-05-03 RX ADMIN — GABAPENTIN 300 MG: 300 CAPSULE ORAL at 17:07

## 2023-05-04 ENCOUNTER — APPOINTMENT (OUTPATIENT)
Dept: NON INVASIVE DIAGNOSTICS | Age: 48
End: 2023-05-04
Attending: FAMILY MEDICINE
Payer: COMMERCIAL

## 2023-05-04 LAB
ANION GAP SERPL CALC-SCNC: 3 MMOL/L (ref 5–15)
BACTERIA SPEC CULT: NORMAL
BNP SERPL-MCNC: 391 PG/ML
BUN SERPL-MCNC: 9 MG/DL (ref 6–20)
BUN/CREAT SERPL: 11 (ref 12–20)
CA-I BLD-MCNC: 9.4 MG/DL (ref 8.5–10.1)
CHLORIDE SERPL-SCNC: 103 MMOL/L (ref 97–108)
CO2 SERPL-SCNC: 31 MMOL/L (ref 21–32)
CREAT SERPL-MCNC: 0.82 MG/DL (ref 0.55–1.02)
ECHO AV AREA PEAK VELOCITY: 1.6 CM2
ECHO AV AREA VTI: 1.9 CM2
ECHO AV AREA/BSA PEAK VELOCITY: 0.8 CM2/M2
ECHO AV AREA/BSA VTI: 1 CM2/M2
ECHO AV MEAN GRADIENT: 5 MMHG
ECHO AV MEAN VELOCITY: 1 M/S
ECHO AV PEAK GRADIENT: 11 MMHG
ECHO AV PEAK VELOCITY: 1.7 M/S
ECHO AV VELOCITY RATIO: 0.76
ECHO AV VTI: 27.9 CM
ECHO EST RA PRESSURE: 3 MMHG
ECHO LA AREA 2C: 14.6 CM2
ECHO LA AREA 4C: 9.9 CM2
ECHO LA DIAMETER INDEX: 1.46 CM/M2
ECHO LA DIAMETER: 2.8 CM
ECHO LA MAJOR AXIS: 4.5 CM
ECHO LA MINOR AXIS: 4.3 CM
ECHO LA VOL BP: 25 ML (ref 22–52)
ECHO LA VOL/BSA BIPLANE: 13 ML/M2 (ref 16–34)
ECHO LV EDV A2C: 50 ML
ECHO LV EDV A4C: 46 ML
ECHO LV EDV INDEX A4C: 24 ML/M2
ECHO LV EDV NDEX A2C: 26 ML/M2
ECHO LV EJECTION FRACTION A2C: 73 %
ECHO LV EJECTION FRACTION A4C: 88 %
ECHO LV ESV A2C: 14 ML
ECHO LV ESV A4C: 6 ML
ECHO LV ESV INDEX A2C: 7 ML/M2
ECHO LV ESV INDEX A4C: 3 ML/M2
ECHO LV FRACTIONAL SHORTENING: 40 % (ref 28–44)
ECHO LV INTERNAL DIMENSION DIASTOLE INDEX: 2.19 CM/M2
ECHO LV INTERNAL DIMENSION DIASTOLIC: 4.2 CM (ref 3.9–5.3)
ECHO LV INTERNAL DIMENSION SYSTOLIC INDEX: 1.3 CM/M2
ECHO LV INTERNAL DIMENSION SYSTOLIC: 2.5 CM
ECHO LV IVSD: 0.9 CM (ref 0.6–0.9)
ECHO LV MASS 2D: 137.2 G (ref 67–162)
ECHO LV MASS INDEX 2D: 71.5 G/M2 (ref 43–95)
ECHO LV POSTERIOR WALL DIASTOLIC: 1.1 CM (ref 0.6–0.9)
ECHO LV RELATIVE WALL THICKNESS RATIO: 0.52
ECHO LVOT AREA: 2 CM2
ECHO LVOT AV VTI INDEX: 0.93
ECHO LVOT DIAM: 1.6 CM
ECHO LVOT MEAN GRADIENT: 4 MMHG
ECHO LVOT PEAK GRADIENT: 7 MMHG
ECHO LVOT PEAK VELOCITY: 1.3 M/S
ECHO LVOT STROKE VOLUME INDEX: 27.1 ML/M2
ECHO LVOT SV: 52 ML
ECHO LVOT VTI: 25.9 CM
ECHO MV A VELOCITY: 0.89 M/S
ECHO MV E VELOCITY: 0.66 M/S
ECHO MV E/A RATIO: 0.74
ECHO PV MAX VELOCITY: 0.5 M/S
ECHO PV PEAK GRADIENT: 1 MMHG
ECHO RA AREA 4C: 7 CM2
ECHO RA END SYSTOLIC VOLUME APICAL 4 CHAMBER INDEX BSA: 6 ML/M2
ECHO RA VOLUME: 11 ML
ECHO RIGHT VENTRICULAR SYSTOLIC PRESSURE (RVSP): 7 MMHG
ECHO RV BASAL DIMENSION: 3 CM
ECHO RV FREE WALL PEAK S': 11 CM/S
ECHO RV MID DIMENSION: 2.1 CM
ECHO RV TAPSE: 2.1 CM (ref 1.7–?)
ECHO TV REGURGITANT MAX VELOCITY: 1.02 M/S
ECHO TV REGURGITANT PEAK GRADIENT: 4 MMHG
GLUCOSE SERPL-MCNC: 112 MG/DL (ref 65–100)
POTASSIUM SERPL-SCNC: 3.2 MMOL/L (ref 3.5–5.1)
SODIUM SERPL-SCNC: 137 MMOL/L (ref 136–145)
SPECIAL REQUESTS,SREQ: NORMAL

## 2023-05-04 PROCEDURE — 74011250637 HC RX REV CODE- 250/637: Performed by: FAMILY MEDICINE

## 2023-05-04 PROCEDURE — 93306 TTE W/DOPPLER COMPLETE: CPT

## 2023-05-04 PROCEDURE — 80048 BASIC METABOLIC PNL TOTAL CA: CPT

## 2023-05-04 PROCEDURE — 74011250637 HC RX REV CODE- 250/637: Performed by: PSYCHIATRY & NEUROLOGY

## 2023-05-04 PROCEDURE — 74011000250 HC RX REV CODE- 250: Performed by: FAMILY MEDICINE

## 2023-05-04 PROCEDURE — 74011250636 HC RX REV CODE- 250/636: Performed by: FAMILY MEDICINE

## 2023-05-04 PROCEDURE — 83880 ASSAY OF NATRIURETIC PEPTIDE: CPT

## 2023-05-04 PROCEDURE — 65270000029 HC RM PRIVATE

## 2023-05-04 PROCEDURE — 36415 COLL VENOUS BLD VENIPUNCTURE: CPT

## 2023-05-04 RX ORDER — HYDROCODONE BITARTRATE AND ACETAMINOPHEN 5; 325 MG/1; MG/1
1 TABLET ORAL
Status: DISCONTINUED | OUTPATIENT
Start: 2023-05-04 | End: 2023-05-05 | Stop reason: HOSPADM

## 2023-05-04 RX ORDER — POTASSIUM CHLORIDE 750 MG/1
40 TABLET, FILM COATED, EXTENDED RELEASE ORAL ONCE
Status: COMPLETED | OUTPATIENT
Start: 2023-05-04 | End: 2023-05-04

## 2023-05-04 RX ADMIN — LEVETIRACETAM 1000 MG: 100 INJECTION, SOLUTION INTRAVENOUS at 17:50

## 2023-05-04 RX ADMIN — OXYCODONE 5 MG: 5 TABLET ORAL at 15:35

## 2023-05-04 RX ADMIN — OXYCODONE 5 MG: 5 TABLET ORAL at 03:48

## 2023-05-04 RX ADMIN — LEVETIRACETAM 1000 MG: 100 INJECTION, SOLUTION INTRAVENOUS at 05:35

## 2023-05-04 RX ADMIN — HEPARIN SODIUM 5000 UNITS: 5000 INJECTION INTRAVENOUS; SUBCUTANEOUS at 05:35

## 2023-05-04 RX ADMIN — GABAPENTIN 300 MG: 300 CAPSULE ORAL at 15:35

## 2023-05-04 RX ADMIN — GABAPENTIN 300 MG: 300 CAPSULE ORAL at 09:32

## 2023-05-04 RX ADMIN — POTASSIUM CHLORIDE 40 MEQ: 750 TABLET, EXTENDED RELEASE ORAL at 10:48

## 2023-05-04 RX ADMIN — HEPARIN SODIUM 5000 UNITS: 5000 INJECTION INTRAVENOUS; SUBCUTANEOUS at 21:00

## 2023-05-04 RX ADMIN — LORAZEPAM 1 MG: 2 INJECTION INTRAMUSCULAR; INTRAVENOUS at 22:40

## 2023-05-04 RX ADMIN — GABAPENTIN 300 MG: 300 CAPSULE ORAL at 21:01

## 2023-05-04 RX ADMIN — SODIUM CHLORIDE, PRESERVATIVE FREE 10 ML: 5 INJECTION INTRAVENOUS at 21:02

## 2023-05-04 RX ADMIN — ONDANSETRON 4 MG: 4 TABLET, ORALLY DISINTEGRATING ORAL at 05:49

## 2023-05-04 RX ADMIN — SODIUM CHLORIDE, PRESERVATIVE FREE 5 ML: 5 INJECTION INTRAVENOUS at 05:36

## 2023-05-04 RX ADMIN — SODIUM CHLORIDE, PRESERVATIVE FREE 5 ML: 5 INJECTION INTRAVENOUS at 21:02

## 2023-05-04 RX ADMIN — ALPRAZOLAM 0.25 MG: 0.25 TABLET ORAL at 20:56

## 2023-05-04 RX ADMIN — ACETAMINOPHEN 650 MG: 325 TABLET ORAL at 13:01

## 2023-05-04 RX ADMIN — HEPARIN SODIUM 5000 UNITS: 5000 INJECTION INTRAVENOUS; SUBCUTANEOUS at 15:35

## 2023-05-04 RX ADMIN — SODIUM CHLORIDE, PRESERVATIVE FREE 5 ML: 5 INJECTION INTRAVENOUS at 15:36

## 2023-05-04 RX ADMIN — PAROXETINE HYDROCHLORIDE 20 MG: 20 TABLET, FILM COATED ORAL at 09:32

## 2023-05-05 VITALS
DIASTOLIC BLOOD PRESSURE: 67 MMHG | BODY MASS INDEX: 22.4 KG/M2 | TEMPERATURE: 98.3 F | HEART RATE: 61 BPM | OXYGEN SATURATION: 91 % | RESPIRATION RATE: 18 BRPM | SYSTOLIC BLOOD PRESSURE: 100 MMHG | WEIGHT: 160 LBS | HEIGHT: 71 IN

## 2023-05-05 LAB
ALBUMIN SERPL-MCNC: 3.3 G/DL (ref 3.5–5)
ALBUMIN/GLOB SERPL: 0.8 (ref 1.1–2.2)
ALP SERPL-CCNC: 88 U/L (ref 45–117)
ALT SERPL-CCNC: 17 U/L (ref 12–78)
ANION GAP SERPL CALC-SCNC: 2 MMOL/L (ref 5–15)
ANION GAP SERPL CALC-SCNC: 6 MMOL/L (ref 5–15)
AST SERPL W P-5'-P-CCNC: 8 U/L (ref 15–37)
BILIRUB SERPL-MCNC: 0.3 MG/DL (ref 0.2–1)
BUN SERPL-MCNC: 10 MG/DL (ref 6–20)
BUN SERPL-MCNC: 9 MG/DL (ref 6–20)
BUN/CREAT SERPL: 12 (ref 12–20)
BUN/CREAT SERPL: 12 (ref 12–20)
CA-I BLD-MCNC: 9 MG/DL (ref 8.5–10.1)
CA-I BLD-MCNC: 9 MG/DL (ref 8.5–10.1)
CHLORIDE SERPL-SCNC: 102 MMOL/L (ref 97–108)
CHLORIDE SERPL-SCNC: 103 MMOL/L (ref 97–108)
CO2 SERPL-SCNC: 27 MMOL/L (ref 21–32)
CO2 SERPL-SCNC: 30 MMOL/L (ref 21–32)
CREAT SERPL-MCNC: 0.73 MG/DL (ref 0.55–1.02)
CREAT SERPL-MCNC: 0.86 MG/DL (ref 0.55–1.02)
ERYTHROCYTE [DISTWIDTH] IN BLOOD BY AUTOMATED COUNT: 13.1 % (ref 11.5–14.5)
GLOBULIN SER CALC-MCNC: 4.3 G/DL (ref 2–4)
GLUCOSE SERPL-MCNC: 103 MG/DL (ref 65–100)
GLUCOSE SERPL-MCNC: 137 MG/DL (ref 65–100)
HCT VFR BLD AUTO: 40.2 % (ref 35–47)
HGB BLD-MCNC: 13.2 G/DL (ref 11.5–16)
MCH RBC QN AUTO: 29.5 PG (ref 26–34)
MCHC RBC AUTO-ENTMCNC: 32.8 G/DL (ref 30–36.5)
MCV RBC AUTO: 89.9 FL (ref 80–99)
NRBC # BLD: 0 K/UL (ref 0–0.01)
NRBC BLD-RTO: 0 PER 100 WBC
PLATELET # BLD AUTO: 348 K/UL (ref 150–400)
PMV BLD AUTO: 10.4 FL (ref 8.9–12.9)
POTASSIUM SERPL-SCNC: 3 MMOL/L (ref 3.5–5.1)
POTASSIUM SERPL-SCNC: 3.7 MMOL/L (ref 3.5–5.1)
PROT SERPL-MCNC: 7.6 G/DL (ref 6.4–8.2)
RBC # BLD AUTO: 4.47 M/UL (ref 3.8–5.2)
SODIUM SERPL-SCNC: 135 MMOL/L (ref 136–145)
SODIUM SERPL-SCNC: 135 MMOL/L (ref 136–145)
WBC # BLD AUTO: 6.4 K/UL (ref 3.6–11)

## 2023-05-05 PROCEDURE — 97165 OT EVAL LOW COMPLEX 30 MIN: CPT

## 2023-05-05 PROCEDURE — 97530 THERAPEUTIC ACTIVITIES: CPT

## 2023-05-05 PROCEDURE — 74011250637 HC RX REV CODE- 250/637: Performed by: PSYCHIATRY & NEUROLOGY

## 2023-05-05 PROCEDURE — 80053 COMPREHEN METABOLIC PANEL: CPT

## 2023-05-05 PROCEDURE — 80048 BASIC METABOLIC PNL TOTAL CA: CPT

## 2023-05-05 PROCEDURE — 74011250637 HC RX REV CODE- 250/637: Performed by: FAMILY MEDICINE

## 2023-05-05 PROCEDURE — 85027 COMPLETE CBC AUTOMATED: CPT

## 2023-05-05 PROCEDURE — 97161 PT EVAL LOW COMPLEX 20 MIN: CPT

## 2023-05-05 PROCEDURE — 36415 COLL VENOUS BLD VENIPUNCTURE: CPT

## 2023-05-05 PROCEDURE — 74011000250 HC RX REV CODE- 250: Performed by: FAMILY MEDICINE

## 2023-05-05 PROCEDURE — 74011250636 HC RX REV CODE- 250/636: Performed by: FAMILY MEDICINE

## 2023-05-05 RX ORDER — LEVETIRACETAM 750 MG/1
1000 TABLET ORAL 2 TIMES DAILY
Qty: 60 TABLET | Refills: 1 | Status: SHIPPED | OUTPATIENT
Start: 2023-05-05

## 2023-05-05 RX ORDER — ROPINIROLE 0.25 MG/1
0.5 TABLET, FILM COATED ORAL 2 TIMES DAILY
Status: DISCONTINUED | OUTPATIENT
Start: 2023-05-05 | End: 2023-05-05 | Stop reason: HOSPADM

## 2023-05-05 RX ORDER — POTASSIUM CHLORIDE 7.45 MG/ML
10 INJECTION INTRAVENOUS
Status: COMPLETED | OUTPATIENT
Start: 2023-05-05 | End: 2023-05-05

## 2023-05-05 RX ORDER — HYDROCODONE BITARTRATE AND ACETAMINOPHEN 5; 325 MG/1; MG/1
1 TABLET ORAL
Qty: 10 TABLET | Refills: 0 | Status: SHIPPED | OUTPATIENT
Start: 2023-05-05 | End: 2023-05-08

## 2023-05-05 RX ORDER — PAROXETINE HYDROCHLORIDE 20 MG/1
20 TABLET, FILM COATED ORAL DAILY
Qty: 60 TABLET | Refills: 0 | Status: SHIPPED | OUTPATIENT
Start: 2023-05-06

## 2023-05-05 RX ORDER — POTASSIUM CHLORIDE 750 MG/1
40 TABLET, FILM COATED, EXTENDED RELEASE ORAL
Status: COMPLETED | OUTPATIENT
Start: 2023-05-05 | End: 2023-05-05

## 2023-05-05 RX ORDER — ROPINIROLE 0.5 MG/1
0.5 TABLET, FILM COATED ORAL 2 TIMES DAILY
Qty: 90 TABLET | Refills: 0 | Status: SHIPPED | OUTPATIENT
Start: 2023-05-05

## 2023-05-05 RX ADMIN — FUROSEMIDE 40 MG: 10 INJECTION, SOLUTION INTRAMUSCULAR; INTRAVENOUS at 08:00

## 2023-05-05 RX ADMIN — POTASSIUM CHLORIDE 10 MEQ: 7.46 INJECTION, SOLUTION INTRAVENOUS at 14:14

## 2023-05-05 RX ADMIN — POTASSIUM CHLORIDE 40 MEQ: 750 TABLET, EXTENDED RELEASE ORAL at 12:52

## 2023-05-05 RX ADMIN — HYDROCODONE BITARTRATE AND ACETAMINOPHEN 1 TABLET: 5; 325 TABLET ORAL at 08:00

## 2023-05-05 RX ADMIN — POTASSIUM CHLORIDE 10 MEQ: 7.46 INJECTION, SOLUTION INTRAVENOUS at 12:42

## 2023-05-05 RX ADMIN — ACETAMINOPHEN 650 MG: 325 TABLET ORAL at 05:28

## 2023-05-05 RX ADMIN — HEPARIN SODIUM 5000 UNITS: 5000 INJECTION INTRAVENOUS; SUBCUTANEOUS at 06:46

## 2023-05-05 RX ADMIN — ALPRAZOLAM 0.25 MG: 0.25 TABLET ORAL at 05:28

## 2023-05-05 RX ADMIN — ONDANSETRON 4 MG: 2 INJECTION INTRAMUSCULAR; INTRAVENOUS at 05:25

## 2023-05-05 RX ADMIN — SODIUM CHLORIDE, PRESERVATIVE FREE 5 ML: 5 INJECTION INTRAVENOUS at 06:47

## 2023-05-05 RX ADMIN — ROPINIROLE HYDROCHLORIDE 0.5 MG: 0.25 TABLET, FILM COATED ORAL at 10:38

## 2023-05-05 RX ADMIN — GABAPENTIN 300 MG: 300 CAPSULE ORAL at 08:00

## 2023-05-05 RX ADMIN — LORAZEPAM 1 MG: 2 INJECTION INTRAMUSCULAR; INTRAVENOUS at 14:56

## 2023-05-05 RX ADMIN — HYDROCODONE BITARTRATE AND ACETAMINOPHEN 1 TABLET: 5; 325 TABLET ORAL at 01:40

## 2023-05-05 RX ADMIN — PAROXETINE HYDROCHLORIDE 20 MG: 20 TABLET, FILM COATED ORAL at 08:07

## 2023-05-05 RX ADMIN — LEVETIRACETAM 1000 MG: 100 INJECTION, SOLUTION INTRAVENOUS at 06:46

## 2023-05-05 RX ADMIN — SODIUM CHLORIDE, PRESERVATIVE FREE 5 ML: 5 INJECTION INTRAVENOUS at 13:00

## 2023-12-23 ENCOUNTER — HOSPITAL ENCOUNTER (EMERGENCY)
Facility: HOSPITAL | Age: 48
Discharge: HOME | End: 2023-12-23
Attending: EMERGENCY MEDICINE
Payer: COMMERCIAL

## 2023-12-23 VITALS
WEIGHT: 160 LBS | HEIGHT: 65 IN | SYSTOLIC BLOOD PRESSURE: 117 MMHG | BODY MASS INDEX: 26.66 KG/M2 | HEART RATE: 77 BPM | OXYGEN SATURATION: 97 % | TEMPERATURE: 98.2 F | DIASTOLIC BLOOD PRESSURE: 75 MMHG | RESPIRATION RATE: 18 BRPM

## 2023-12-23 DIAGNOSIS — S39.012A BACK STRAIN, INITIAL ENCOUNTER: Primary | ICD-10-CM

## 2023-12-23 PROCEDURE — 99283 EMERGENCY DEPT VISIT LOW MDM: CPT

## 2023-12-23 PROCEDURE — 6370000000 HC RX 637 (ALT 250 FOR IP): Performed by: EMERGENCY MEDICINE

## 2023-12-23 RX ORDER — IBUPROFEN 800 MG/1
800 TABLET ORAL EVERY 8 HOURS PRN
Qty: 20 TABLET | Refills: 1 | Status: SHIPPED | OUTPATIENT
Start: 2023-12-23 | End: 2023-12-30

## 2023-12-23 RX ORDER — IBUPROFEN 600 MG/1
600 TABLET ORAL
Status: COMPLETED | OUTPATIENT
Start: 2023-12-23 | End: 2023-12-23

## 2023-12-23 RX ORDER — HYDROCODONE BITARTRATE AND ACETAMINOPHEN 5; 325 MG/1; MG/1
2 TABLET ORAL
Status: COMPLETED | OUTPATIENT
Start: 2023-12-23 | End: 2023-12-23

## 2023-12-23 RX ORDER — HYDROCODONE BITARTRATE AND ACETAMINOPHEN 5; 325 MG/1; MG/1
1 TABLET ORAL EVERY 4 HOURS PRN
Qty: 9 TABLET | Refills: 0 | Status: SHIPPED | OUTPATIENT
Start: 2023-12-23 | End: 2023-12-26

## 2023-12-23 RX ADMIN — IBUPROFEN 600 MG: 600 TABLET, FILM COATED ORAL at 20:29

## 2023-12-23 RX ADMIN — HYDROCODONE BITARTRATE AND ACETAMINOPHEN 2 TABLET: 5; 325 TABLET ORAL at 20:29

## 2023-12-24 NOTE — ED TRIAGE NOTES
Pt states was moving things in a house last night and feels like she may have pulled something in her lower back. States has a hx of chronic low back pain due to previous surgeries but this is new.

## 2023-12-24 NOTE — ED PROVIDER NOTES
to ER should their symptoms worsen. PATIENT REFERRED TO:  Areli Portillo PA-C  [address] [place] [id number] [phone]              DISCHARGE MEDICATIONS:     Medication List        START taking these medications      HYDROcodone-acetaminophen 5-325 MG per tablet  Commonly known as: Norco  Take 1 tablet by mouth every 4 hours as needed for Pain for up to 3 days. Intended supply: 3 days. Take lowest dose possible to manage pain Max Daily Amount: 6 tablets     ibuprofen 800 MG tablet  Commonly known as: ADVIL;MOTRIN  Take 1 tablet by mouth every 8 hours as needed for Pain            ASK your doctor about these medications      ALPRAZolam 1 MG tablet  Commonly known as: XANAX     carBAMazepine 400 MG extended release tablet  Commonly known as: TEGRETOL XR     gabapentin 300 MG capsule  Commonly known as: NEURONTIN     levETIRAcetam 750 MG tablet  Commonly known as: KEPPRA     oxyCODONE 15 MG immediate release tablet  Commonly known as: OXY-IR               Where to Get Your Medications        These medications were sent to [id number] [address] [id number]  [address] [place] [address]      Phone: [phone]   HYDROcodone-acetaminophen 5-325 MG per tablet  ibuprofen 800 MG tablet           DISCONTINUED MEDICATIONS:  Discharge Medication List as of 12/23/2023  9:16 PM          I am the Primary Clinician of Record: Teresa Palma MD (electronically signed)    (Please note that parts of this dictation were completed with voice recognition software. Quite often unanticipated grammatical, syntax, homophones, and other interpretive errors are inadvertently transcribed by the computer software. Please disregards these errors.  Please excuse any errors that have escaped final proofreading.)     Teresa Palma MD  12/24/23 [id number]

## 2025-01-24 ENCOUNTER — OFFICE VISIT (OUTPATIENT)
Age: 50
End: 2025-01-24
Payer: COMMERCIAL

## 2025-01-24 ENCOUNTER — PREP FOR PROCEDURE (OUTPATIENT)
Age: 50
End: 2025-01-24

## 2025-01-24 VITALS
OXYGEN SATURATION: 100 % | TEMPERATURE: 97.3 F | BODY MASS INDEX: 27.35 KG/M2 | SYSTOLIC BLOOD PRESSURE: 114 MMHG | HEART RATE: 80 BPM | RESPIRATION RATE: 18 BRPM | DIASTOLIC BLOOD PRESSURE: 82 MMHG | WEIGHT: 164.13 LBS | HEIGHT: 65 IN

## 2025-01-24 DIAGNOSIS — Z12.31 ENCOUNTER FOR SCREENING MAMMOGRAM FOR MALIGNANT NEOPLASM OF BREAST: ICD-10-CM

## 2025-01-24 DIAGNOSIS — N90.89 VULVAR SKIN TAG: ICD-10-CM

## 2025-01-24 DIAGNOSIS — N90.89 VULVAR SKIN TAG: Primary | ICD-10-CM

## 2025-01-24 DIAGNOSIS — Z11.3 SCREENING EXAMINATION FOR STD (SEXUALLY TRANSMITTED DISEASE): ICD-10-CM

## 2025-01-24 PROCEDURE — 99213 OFFICE O/P EST LOW 20 MIN: CPT | Performed by: OBSTETRICS & GYNECOLOGY

## 2025-01-24 NOTE — PROGRESS NOTES
Julia GIANG is a 49 y.o. female who presents today for the following:    Chief Complaint   Patient presents with    New Patient     C/O bumps on the vagina        Allergies   Allergen Reactions    Iodine Anaphylaxis     Reaction Type: Allergy    Penicillins Hives, Swelling, Anaphylaxis and Other (See Comments)     swelling    Reaction Type: Allergy; Reaction(s): \"throat swells up\",anaphylaxis   Reaction Type: Allergy   swelling    Shellfish Allergy Anaphylaxis    Shellfish-Derived Products Anaphylaxis          Current Outpatient Medications:     ibuprofen (ADVIL;MOTRIN) 800 MG tablet, Take 1 tablet by mouth every 8 hours as needed for Pain, Disp: 20 tablet, Rfl: 1    ALPRAZolam (XANAX) 1 MG tablet, Take 1 tablet by mouth 3 times daily. Max Daily Amount: 3 mg, Disp: , Rfl:     carBAMazepine (TEGRETOL XR) 400 MG extended release tablet, Take 2 tablets by mouth once, Disp: , Rfl:     gabapentin (NEURONTIN) 300 MG capsule, Take 1 capsule by mouth 3 times daily., Disp: , Rfl:     levETIRAcetam (KEPPRA) 750 MG tablet, Take 1 tablet by mouth 2 times daily, Disp: , Rfl:     oxyCODONE (OXY-IR) 15 MG immediate release tablet, Take 1 tablet by mouth every 4 hours as needed. Max Daily Amount: 90 mg, Disp: , Rfl:      Past Medical History:   Diagnosis Date    Arthritis     rhumatoid arthritis    Cervical cancer (HCC) 1999    Ill-defined condition     fibromyalgia    Ill-defined condition     migraine ha    Psychiatric disorder     depression,panic attacks    Seizures ([facility])     Petie mal and gran mal seizures, last on March 28th    Wrist fracture, right 8/2015        Past Surgical History:   Procedure Laterality Date    HEENT  Child    Tonsillectomy and Adenoidectomy    HYSTERECTOMY (CERVIX STATUS UNKNOWN)  1999    ORTHOPEDIC SURGERY  11/2015    Removal hardware from Right Wrist    ORTHOPEDIC SURGERY  8/2015    Repair Right Wrist Fracture    WISDOM TOOTH EXTRACTION          History reviewed. No pertinent family history.     Social

## 2025-01-28 LAB
A VAGINAE DNA VAG QL NAA+PROBE: NORMAL SCORE
BVAB2 DNA VAG QL NAA+PROBE: NORMAL SCORE
C ALBICANS DNA VAG QL NAA+PROBE: NEGATIVE
C GLABRATA DNA VAG QL NAA+PROBE: NEGATIVE
C TRACH DNA SPEC QL NAA+PROBE: NEGATIVE
MEGA1 DNA VAG QL NAA+PROBE: NORMAL SCORE
N GONORRHOEA DNA VAG QL NAA+PROBE: NEGATIVE
T VAGINALIS DNA VAG QL NAA+PROBE: NEGATIVE

## 2025-02-17 ENCOUNTER — TELEPHONE (OUTPATIENT)
Age: 50
End: 2025-02-17

## 2025-03-10 ENCOUNTER — TELEPHONE (OUTPATIENT)
Age: 50
End: 2025-03-10

## 2025-03-10 NOTE — TELEPHONE ENCOUNTER
Called and left message to reschedule surgery from 03/10/25 with Dr. Sosa. Waiting on call back to proceed.

## (undated) DEVICE — (D)PREP SKN CHLRAPRP APPL 26ML -- CONVERT TO ITEM 371833

## (undated) DEVICE — BANDAGE COMPR 9 FTX4 IN SMOOTH COMFORTABLE SYNTH ESMRK LF

## (undated) DEVICE — SUT ETHLN 4-0 18IN PS2 BLK --

## (undated) DEVICE — DRAPE CARM MINI FOR IMAG SYS INSIGHT FLROSCN

## (undated) DEVICE — PADDING CST 4IN STERILE --

## (undated) DEVICE — DRAPE,REIN 53X77,STERILE: Brand: MEDLINE

## (undated) DEVICE — ASTOUND STANDARD SURGICAL GOWN, XL: Brand: CONVERTORS

## (undated) DEVICE — SOLUTION IV 1000ML 0.9% SOD CHL

## (undated) DEVICE — PADDING CST 4INX4YD --

## (undated) DEVICE — TIBURON HAND DRAPE: Brand: CONVERTORS

## (undated) DEVICE — SUTURE VCRL SZ 0 L27IN ABSRB UD L26MM CT-2 1/2 CIR J270H

## (undated) DEVICE — DISPOSABLE TOURNIQUET CUFF SINGLE BLADDER, DUAL PORT AND QUICK CONNECT CONNECTOR: Brand: COLOR CUFF

## (undated) DEVICE — SUTURE VCRL SZ 2-0 L27IN ABSRB UD L26MM SH 1/2 CIR J417H

## (undated) DEVICE — KENDALL SCD EXPRESS SLEEVES, KNEE LENGTH, MEDIUM: Brand: KENDALL SCD

## (undated) DEVICE — BIPOLAR FORCEPS CORD: Brand: VALLEYLAB

## (undated) DEVICE — PRECISION OFFSET (9.0 X 0.51 X 31.0MM)

## (undated) DEVICE — 2.0 MM X 3.5" QR DRILL: Brand: ACUMED

## (undated) DEVICE — 2.8MM QUICK RELEASE DRILL: Brand: ACUMED

## (undated) DEVICE — Z DISCONTINUED GLOVE SURG SZ 7.5 L12IN FNGR THK13MIL WHT ISOLEX

## (undated) DEVICE — Device

## (undated) DEVICE — BULB SYRINGE, IRRIGATION WITH PROTECTIVE CAP, 60 CC, INDIVIDUALLY WRAPPED: Brand: DOVER

## (undated) DEVICE — INFECTION CONTROL KIT SYS

## (undated) DEVICE — OCCLUSIVE GAUZE STRIP,3% BISMUTH TRIBROMOPHENATE IN PETROLATUM BLEND: Brand: XEROFORM

## (undated) DEVICE — GAUZE SPONGES,12 PLY: Brand: CURITY

## (undated) DEVICE — HOOK LOCK LATEX FREE ELASTIC BANDAGE 3INX5YD